# Patient Record
Sex: MALE | Race: WHITE | NOT HISPANIC OR LATINO | Employment: OTHER | URBAN - METROPOLITAN AREA
[De-identification: names, ages, dates, MRNs, and addresses within clinical notes are randomized per-mention and may not be internally consistent; named-entity substitution may affect disease eponyms.]

---

## 2017-03-23 ENCOUNTER — OFFICE VISIT (OUTPATIENT)
Dept: INTERNAL MEDICINE | Facility: CLINIC | Age: 37
End: 2017-03-23

## 2017-03-23 VITALS
DIASTOLIC BLOOD PRESSURE: 76 MMHG | TEMPERATURE: 97.8 F | BODY MASS INDEX: 28.25 KG/M2 | HEIGHT: 68 IN | WEIGHT: 186.4 LBS | SYSTOLIC BLOOD PRESSURE: 128 MMHG

## 2017-03-23 DIAGNOSIS — L30.9 ECZEMA, UNSPECIFIED TYPE: ICD-10-CM

## 2017-03-23 DIAGNOSIS — R06.83 SNORING: Primary | ICD-10-CM

## 2017-03-23 DIAGNOSIS — J45.20 MILD INTERMITTENT ASTHMA WITHOUT COMPLICATION: ICD-10-CM

## 2017-03-23 PROBLEM — K21.9 GASTROESOPHAGEAL REFLUX DISEASE WITHOUT ESOPHAGITIS: Status: ACTIVE | Noted: 2017-03-23

## 2017-03-23 PROBLEM — H93.19 TINNITUS: Status: ACTIVE | Noted: 2017-03-23

## 2017-03-23 PROCEDURE — 99204 OFFICE O/P NEW MOD 45 MIN: CPT | Performed by: FAMILY MEDICINE

## 2017-03-23 RX ORDER — FLUTICASONE PROPIONATE 110 UG/1
1 AEROSOL, METERED RESPIRATORY (INHALATION)
COMMUNITY
End: 2017-03-23

## 2017-03-23 RX ORDER — ALBUTEROL SULFATE 90 UG/1
AEROSOL, METERED RESPIRATORY (INHALATION)
Qty: 18 G | Refills: 2 | Status: SHIPPED | OUTPATIENT
Start: 2017-03-23 | End: 2019-01-21 | Stop reason: SDUPTHER

## 2017-03-23 RX ORDER — TRIAMCINOLONE ACETONIDE 1 MG/G
CREAM TOPICAL 2 TIMES DAILY
COMMUNITY
End: 2018-06-02

## 2017-03-23 NOTE — PROGRESS NOTES
"Subjective   Bennie Tapia is a 36 y.o. male.     History of Present Illness   Pt here to re establish. Not seen in several years due to insurance.    GI- GERD. Today pt reports he was on nexium for a long time and then he improved. Now he only has issues if he eats certain foods such as onion. Uses OTC omeprazole.     ALLERGIES- asthma and eczema, currently on prn rescue inhaler and triamcinolone cream. Today pt reports he is having to use the proair about 2x/wk. Occurs with temp changes. Is concerned re sleep apnea. Was advised he has a small airway by the dentist and his wife has told him that he snores with apnea. He uses the triamcinalone on his fingers prn. Has not needed to use much and still has plenty.    The following portions of the patient's history were reviewed and updated as appropriate: current medications, past family history, past medical history, past social history, past surgical history and problem list.    Review of Systems   Constitutional: Positive for fatigue.   HENT: Positive for hearing loss.    Eyes: Positive for itching.   Respiratory: Positive for cough, shortness of breath (when lying down) and wheezing.    Cardiovascular: Negative for chest pain.   Gastrointestinal: Negative for abdominal distention and abdominal pain.   Skin: Positive for rash (itching). Negative for color change.   Neurological: Negative for tremors, speech difficulty and headaches.   Psychiatric/Behavioral: Negative for agitation and confusion.   All other systems reviewed and are negative.        Current Outpatient Prescriptions:   •  albuterol (PROAIR HFA) 108 (90 BASE) MCG/ACT inhaler, 2 puffs q4-6hr prn asthma, wheeze, cough or shortness of breath, Disp: 18 g, Rfl: 2  •  triamcinolone (KENALOG) 0.1 % cream, Apply  topically 2 (Two) Times a Day., Disp: , Rfl:     Objective     /76  Temp 97.8 °F (36.6 °C)  Ht 68\" (172.7 cm)  Wt 186 lb 6.4 oz (84.6 kg)  BMI 28.34 kg/m2    Physical Exam "   Constitutional: He is oriented to person, place, and time. He appears well-developed and well-nourished.   HENT:   Right Ear: Tympanic membrane and ear canal normal.   Left Ear: Tympanic membrane and ear canal normal.   Mouth/Throat: Oropharynx is clear and moist.   Eyes: Conjunctivae and EOM are normal. Pupils are equal, round, and reactive to light.   Neck: No thyromegaly present.   Cardiovascular: Normal rate and regular rhythm.    Pulmonary/Chest: Effort normal and breath sounds normal.   Neurological: He is alert and oriented to person, place, and time.   Skin: Skin is warm and dry.   Psychiatric: He has a normal mood and affect.   Vitals reviewed.      Assessment/Plan   Bennie was seen today for establish care.    Diagnoses and all orders for this visit:    Snoring  -     Ambulatory Referral to Sleep Medicine    Mild intermittent asthma without complication    Eczema, unspecified type    Other orders  -     albuterol (PROAIR HFA) 108 (90 BASE) MCG/ACT inhaler; 2 puffs q4-6hr prn asthma, wheeze, cough or shortness of breath      1. RESP- asthma, mild and intermittent. Continue the proair. Discussed that if he starts to need this every day, to come in.   2. SLEEP- concern re apena based on hx. Will refer to sleep center.  3. DERM- hand dermatitis. edu provided. Pt will continue to use the steroid cream as needed.  4. GI- GERD. Pt will continue to use OTC. Will monitor calcium, B12, etc. Will do labs at her CPE.  5. RECHECK- 1yr CPE

## 2017-03-23 NOTE — PATIENT INSTRUCTIONS
1. RESP- asthma, mild and intermittent. Continue the proair. Discussed that if he starts to need this every day, to come in.   2. SLEEP- concern re apena based on hx. Will refer to sleep center.  3. DERM- hand dermatitis. edu provided. Pt will continue to use the steroid cream as needed.  4. GI- GERD. Pt will continue to use OTC. Will monitor calcium, B12, etc. Will do labs at her CPE.  5. RECHECK- 1yr CPE

## 2017-08-10 ENCOUNTER — CONSULT (OUTPATIENT)
Dept: SLEEP MEDICINE | Facility: HOSPITAL | Age: 37
End: 2017-08-10

## 2017-08-10 VITALS
WEIGHT: 191 LBS | OXYGEN SATURATION: 98 % | SYSTOLIC BLOOD PRESSURE: 112 MMHG | DIASTOLIC BLOOD PRESSURE: 78 MMHG | BODY MASS INDEX: 28.95 KG/M2 | HEIGHT: 68 IN | HEART RATE: 60 BPM

## 2017-08-10 DIAGNOSIS — R06.83 SNORING: Primary | ICD-10-CM

## 2017-08-10 DIAGNOSIS — G47.33 OBSTRUCTIVE SLEEP APNEA, ADULT: ICD-10-CM

## 2017-08-10 PROCEDURE — 99203 OFFICE O/P NEW LOW 30 MIN: CPT | Performed by: INTERNAL MEDICINE

## 2017-08-10 RX ORDER — OMEPRAZOLE 20 MG/1
20 CAPSULE, DELAYED RELEASE ORAL DAILY
COMMUNITY

## 2017-08-10 RX ORDER — LORATADINE 10 MG/1
CAPSULE, LIQUID FILLED ORAL
COMMUNITY

## 2017-08-10 NOTE — PATIENT INSTRUCTIONS
Sleep Apnea  Sleep apnea is a condition in which breathing pauses or becomes shallow during sleep. Episodes of sleep apnea usually last 10 seconds or longer, and they may occur as many as 20 times an hour. Sleep apnea disrupts your sleep and keeps your body from getting the rest that it needs. This condition can increase your risk of certain health problems, including:  · Heart attack.  · Stroke.  · Obesity.  · Diabetes.  · Heart failure.  · Irregular heartbeat.  There are three kinds of sleep apnea:  · Obstructive sleep apnea. This kind is caused by a blocked or collapsed airway.  · Central sleep apnea. This kind happens when the part of the brain that controls breathing does not send the correct signals to the muscles that control breathing.  · Mixed sleep apnea. This is a combination of obstructive and central sleep apnea.  CAUSES  The most common cause of this condition is a collapsed or blocked airway. An airway can collapse or become blocked if:  · Your throat muscles are abnormally relaxed.  · Your tongue and tonsils are larger than normal.  · You are overweight.  · Your airway is smaller than normal.  RISK FACTORS  This condition is more likely to develop in people who:  · Are overweight.  · Smoke.  · Have a smaller than normal airway.  · Are elderly.  · Are male.  · Drink alcohol.  · Take sedatives or tranquilizers.  · Have a family history of sleep apnea.  SYMPTOMS  Symptoms of this condition include:  · Trouble staying asleep.  · Daytime sleepiness and tiredness.  · Irritability.  · Loud snoring.  · Morning headaches.  · Trouble concentrating.  · Forgetfulness.  · Decreased interest in sex.  · Unexplained sleepiness.  · Mood swings.  · Personality changes.  · Feelings of depression.  · Waking up often during the night to urinate.  · Dry mouth.  · Sore throat.  DIAGNOSIS  This condition may be diagnosed with:  · A medical history.  · A physical exam.  · A series of tests that are done while you are  sleeping (sleep study). These tests are usually done in a sleep lab, but they may also be done at home.  TREATMENT  Treatment for this condition aims to restore normal breathing and to ease symptoms during sleep. It may involve managing health issues that can affect breathing, such as high blood pressure or obesity. Treatment may include:  · Sleeping on your side.  · Using a decongestant if you have nasal congestion.  · Avoiding the use of depressants, including alcohol, sedatives, and narcotics.  · Losing weight if you are overweight.  · Making changes to your diet.  · Quitting smoking.  · Using a device to open your airway while you sleep, such as:    An oral appliance. This is a custom-made mouthpiece that shifts your lower jaw forward.    A continuous positive airway pressure (CPAP) device. This device delivers oxygen to your airway through a mask.    A nasal expiratory positive airway pressure (EPAP) device. This device has valves that you put into each nostril.    A bi-level positive airway pressure (BPAP) device. This device delivers oxygen to your airway through a mask.  · Surgery if other treatments do not work. During surgery, excess tissue is removed to create a wider airway.  It is important to get treatment for sleep apnea. Without treatment, this condition can lead to:  · High blood pressure.  · Coronary artery disease.  · (Men) An inability to achieve or maintain an erection (impotence).  · Reduced thinking abilities.  HOME CARE INSTRUCTIONS  · Make any lifestyle changes that your health care provider recommends.  · Eat a healthy, well-balanced diet.  · Take over-the-counter and prescription medicines only as told by your health care provider.  · Avoid using depressants, including alcohol, sedatives, and narcotics.  · Take steps to lose weight if you are overweight.  · If you were given a device to open your airway while you sleep, use it only as told by your health care provider.  · Do not use any  tobacco products, such as cigarettes, chewing tobacco, and e-cigarettes. If you need help quitting, ask your health care provider.  · Keep all follow-up visits as told by your health care provider. This is important.  SEEK MEDICAL CARE IF:  · The device that you received to open your airway during sleep is uncomfortable or does not seem to be working.  · Your symptoms do not improve.  · Your symptoms get worse.  SEEK IMMEDIATE MEDICAL CARE IF:  · You develop chest pain.  · You develop shortness of breath.  · You develop discomfort in your back, arms, or stomach.  · You have trouble speaking.  · You have weakness on one side of your body.  · You have drooping in your face.  These symptoms may represent a serious problem that is an emergency. Do not wait to see if the symptoms will go away. Get medical help right away. Call your local emergency services (911 in the [place] Do not drive yourself to the hospital.     This information is not intended to replace advice given to you by your health care provider. Make sure you discuss any questions you have with your health care provider.     Document Released: 12/08/2003 Document Revised: 04/10/2017 Document Reviewed: 09/26/2016  [employer] Patient Education ©2017 [employer]

## 2017-08-11 NOTE — PROGRESS NOTES
Subjective   Bennie Tapia is a 36 y.o. male is being seen for consultation today at the request of Kirstin Diego MD  he is referred for the evaluation of snoring and nonrestorative sleep.    History of Present Illness  Patient has a history of snoring for roughly 6 years.  He occasionally wakens feeling short of breath.  He is been noted to have apneas for roughly 4 years.  Says he does not sleep well at night.  He is gained roughly 10 pounds in the past year.  He has awakened gasping.  He says usually not rested in the morning.  He has a morning headache 1-2 days per week.  He denies falling asleep during the day at work.  He sometimes falls asleep if he sitting quietly at home.  He denies any problems while driving.    He's totally snores loudly and snores in all positions.  He has awakened with a dry mouth and choking as well as sore throat.  He denies ever breaking his nose but occasionally has trouble breathing through his nose when he has allergy problems.  He has a history of reflux symptoms and is on medicines as needed.  He denies hypnagogic hallucinations sleep paralysis or cataplexy.  He has occasional kicking at night but says it does not keep him awake.  He denies any chronic pain at night.    He goes to bed about 9:30 PM.  He states it will take about an hour to go to sleep.  He awakens twice during the night.  He gets about 7 hours of sleep but says he feels stiff and not very rested.  He denies any history of hypertension diabetes or coronary artery disease.  Allergies   Allergen Reactions   • Nickel           Current Outpatient Prescriptions:   •  albuterol (PROAIR HFA) 108 (90 BASE) MCG/ACT inhaler, 2 puffs q4-6hr prn asthma, wheeze, cough or shortness of breath, Disp: 18 g, Rfl: 2  •  Loratadine (CLARITIN) 10 MG capsule, Take  by mouth., Disp: , Rfl:   •  omeprazole (priLOSEC) 20 MG capsule, Take 20 mg by mouth Daily., Disp: , Rfl:   •  triamcinolone (KENALOG) 0.1 % cream, Apply   "topically 2 (Two) Times a Day., Disp: , Rfl:     Smoking status: Former Smoker                                                              Packs/day: 0.75      Years: 20.00    quit smoking 2015.  Smokeless status: Never Used                           History   Alcohol Use   • Yes     Comment: occasional Has less than 66 per month       Caffeine: He has 16 ounces of coffee 8 ounces of tea in 8 ounces of cola each day    Past Medical History:   Diagnosis Date   • GERD (gastroesophageal reflux disease)        Past Surgical History:   Procedure Laterality Date   • WISDOM TOOTH EXTRACTION  2003       Family History   Problem Relation Age of Onset   • Hypertension Father    • Heart attack Paternal Uncle    • Cancer Paternal Grandmother        The following portions of the patient's history were reviewed and updated as appropriate: allergies, current medications, past family history, past medical history, past social history, past surgical history and problem list.    Review of Systems   Constitutional: Positive for fatigue and unexpected weight change.   HENT: Positive for postnasal drip and tinnitus.    Eyes: Negative.    Respiratory: Negative.    Cardiovascular: Negative.    Gastrointestinal: Negative.    Endocrine: Negative.    Genitourinary: Negative.    Musculoskeletal: Positive for arthralgias, back pain, gait problem and joint swelling.   Skin: Positive for rash.   Allergic/Immunologic: Negative.    Neurological: Positive for headaches.   Hematological: Negative.    Psychiatric/Behavioral: Negative.      Prospect scores 4/24  Objective     /78  Pulse 60  Ht 68\" (172.7 cm)  Wt 191 lb (86.6 kg)  SpO2 98%  BMI 29.04 kg/m2     Physical Exam   Constitutional: He is oriented to person, place, and time. He appears well-developed and well-nourished.   He is overweight.   HENT:   Head: Normocephalic and atraumatic.   He has nasal airway narrowing and Mallampati class II anatomy   Eyes: EOM are normal. Pupils are " equal, round, and reactive to light.   Neck: Normal range of motion. Neck supple.   Cardiovascular: Normal rate, regular rhythm and normal heart sounds.    Pulmonary/Chest: Effort normal and breath sounds normal.   Abdominal: Soft. Bowel sounds are normal.   Musculoskeletal: Normal range of motion. He exhibits no edema.   Neurological: He is alert and oriented to person, place, and time.   Skin: Skin is warm and dry.   Psychiatric: He has a normal mood and affect. His behavior is normal.         Assessment/Plan   Bennie was seen today for sleeping problem.    Diagnoses and all orders for this visit:    Snoring  -     Home Sleep Study; Future    Obstructive sleep apnea, adult  -     Home Sleep Study; Future     patient is a history of snoring and nonrestorative sleep.  I think he gets a fairly good story for obstructive sleep apnea.  We will plan to proceed to evaluation with home sleep testing.  I've discussed possible therapies including CPAP weight loss oral appliances and surgery.  Also discussed possible complications of long-term untreated obstructive sleep apnea.  He is to return in follow-up here roughly 2 weeks after his study.  He is encouraged to lose weight.  He is encouraged to avoid alcohol and sedatives close to bedtime.  He is encouraged practice lateral position sleep.         Rosas Dennis MD [facility]  Sleep Medicine  Pulmonary and Critical Care Medicine

## 2017-08-12 ENCOUNTER — APPOINTMENT (OUTPATIENT)
Dept: CT IMAGING | Facility: HOSPITAL | Age: 37
End: 2017-08-12

## 2017-08-12 ENCOUNTER — HOSPITAL ENCOUNTER (EMERGENCY)
Facility: HOSPITAL | Age: 37
Discharge: HOME | End: 2017-08-12
Attending: EMERGENCY MEDICINE | Admitting: EMERGENCY MEDICINE

## 2017-08-12 VITALS
BODY MASS INDEX: 28.04 KG/M2 | RESPIRATION RATE: 16 BRPM | HEIGHT: 68 IN | SYSTOLIC BLOOD PRESSURE: 125 MMHG | OXYGEN SATURATION: 100 % | WEIGHT: 185 LBS | DIASTOLIC BLOOD PRESSURE: 88 MMHG | TEMPERATURE: 98.1 F | HEART RATE: 58 BPM

## 2017-08-12 DIAGNOSIS — N23 RENAL COLIC ON LEFT SIDE: Primary | ICD-10-CM

## 2017-08-12 DIAGNOSIS — N20.0 KIDNEY STONE: ICD-10-CM

## 2017-08-12 DIAGNOSIS — R03.0 ELEVATED BLOOD PRESSURE (NOT HYPERTENSION): ICD-10-CM

## 2017-08-12 LAB
ALBUMIN SERPL-MCNC: 4.4 G/DL (ref 3.2–4.8)
ALBUMIN/GLOB SERPL: 1.8 G/DL (ref 1.5–2.5)
ALP SERPL-CCNC: 64 U/L (ref 25–100)
ALT SERPL W P-5'-P-CCNC: 30 U/L (ref 7–40)
ANION GAP SERPL CALCULATED.3IONS-SCNC: 4 MMOL/L (ref 3–11)
AST SERPL-CCNC: 18 U/L (ref 0–33)
BACTERIA UR QL AUTO: ABNORMAL /HPF
BASOPHILS # BLD AUTO: 0.04 10*3/MM3 (ref 0–0.2)
BASOPHILS NFR BLD AUTO: 0.5 % (ref 0–1)
BILIRUB SERPL-MCNC: 0.4 MG/DL (ref 0.3–1.2)
BILIRUB UR QL STRIP: ABNORMAL
BUN BLD-MCNC: 17 MG/DL (ref 9–23)
BUN/CREAT SERPL: 15.5 (ref 7–25)
CALCIUM SPEC-SCNC: 9.3 MG/DL (ref 8.7–10.4)
CHLORIDE SERPL-SCNC: 106 MMOL/L (ref 99–109)
CLARITY UR: CLEAR
CO2 SERPL-SCNC: 30 MMOL/L (ref 20–31)
COLOR UR: ABNORMAL
CREAT BLD-MCNC: 1.1 MG/DL (ref 0.6–1.3)
DEPRECATED RDW RBC AUTO: 37.8 FL (ref 37–54)
EOSINOPHIL # BLD AUTO: 0.54 10*3/MM3 (ref 0–0.3)
EOSINOPHIL NFR BLD AUTO: 7.3 % (ref 0–3)
ERYTHROCYTE [DISTWIDTH] IN BLOOD BY AUTOMATED COUNT: 11.9 % (ref 11.3–14.5)
GFR SERPL CREATININE-BSD FRML MDRD: 75 ML/MIN/1.73
GLOBULIN UR ELPH-MCNC: 2.4 GM/DL
GLUCOSE BLD-MCNC: 123 MG/DL (ref 70–100)
GLUCOSE UR STRIP-MCNC: NEGATIVE MG/DL
HCT VFR BLD AUTO: 44.6 % (ref 38.9–50.9)
HGB BLD-MCNC: 15.1 G/DL (ref 13.1–17.5)
HGB UR QL STRIP.AUTO: ABNORMAL
HOLD SPECIMEN: NORMAL
HOLD SPECIMEN: NORMAL
HYALINE CASTS UR QL AUTO: ABNORMAL /LPF
IMM GRANULOCYTES # BLD: 0.01 10*3/MM3 (ref 0–0.03)
IMM GRANULOCYTES NFR BLD: 0.1 % (ref 0–0.6)
KETONES UR QL STRIP: NEGATIVE
LEUKOCYTE ESTERASE UR QL STRIP.AUTO: NEGATIVE
LIPASE SERPL-CCNC: 36 U/L (ref 6–51)
LYMPHOCYTES # BLD AUTO: 2.93 10*3/MM3 (ref 0.6–4.8)
LYMPHOCYTES NFR BLD AUTO: 39.8 % (ref 24–44)
MCH RBC QN AUTO: 29.9 PG (ref 27–31)
MCHC RBC AUTO-ENTMCNC: 33.9 G/DL (ref 32–36)
MCV RBC AUTO: 88.3 FL (ref 80–99)
MONOCYTES # BLD AUTO: 0.56 10*3/MM3 (ref 0–1)
MONOCYTES NFR BLD AUTO: 7.6 % (ref 0–12)
MUCOUS THREADS URNS QL MICRO: ABNORMAL /HPF
NEUTROPHILS # BLD AUTO: 3.29 10*3/MM3 (ref 1.5–8.3)
NEUTROPHILS NFR BLD AUTO: 44.7 % (ref 41–71)
NITRITE UR QL STRIP: NEGATIVE
PH UR STRIP.AUTO: 5.5 [PH] (ref 5–8)
PLATELET # BLD AUTO: 251 10*3/MM3 (ref 150–450)
PMV BLD AUTO: 9.6 FL (ref 6–12)
POTASSIUM BLD-SCNC: 4 MMOL/L (ref 3.5–5.5)
PROT SERPL-MCNC: 6.8 G/DL (ref 5.7–8.2)
PROT UR QL STRIP: ABNORMAL
RBC # BLD AUTO: 5.05 10*6/MM3 (ref 4.2–5.76)
RBC # UR: ABNORMAL /HPF
REF LAB TEST METHOD: ABNORMAL
SODIUM BLD-SCNC: 140 MMOL/L (ref 132–146)
SP GR UR STRIP: >=1.03 (ref 1–1.03)
SQUAMOUS #/AREA URNS HPF: ABNORMAL /HPF
UROBILINOGEN UR QL STRIP: ABNORMAL
WBC NRBC COR # BLD: 7.37 10*3/MM3 (ref 3.5–10.8)
WBC UR QL AUTO: ABNORMAL /HPF
WHOLE BLOOD HOLD SPECIMEN: NORMAL
WHOLE BLOOD HOLD SPECIMEN: NORMAL

## 2017-08-12 PROCEDURE — 81001 URINALYSIS AUTO W/SCOPE: CPT | Performed by: EMERGENCY MEDICINE

## 2017-08-12 PROCEDURE — 96374 THER/PROPH/DIAG INJ IV PUSH: CPT

## 2017-08-12 PROCEDURE — 74176 CT ABD & PELVIS W/O CONTRAST: CPT

## 2017-08-12 PROCEDURE — 99284 EMERGENCY DEPT VISIT MOD MDM: CPT

## 2017-08-12 PROCEDURE — 83690 ASSAY OF LIPASE: CPT | Performed by: EMERGENCY MEDICINE

## 2017-08-12 PROCEDURE — 80053 COMPREHEN METABOLIC PANEL: CPT | Performed by: EMERGENCY MEDICINE

## 2017-08-12 PROCEDURE — 25010000002 KETOROLAC TROMETHAMINE PER 15 MG: Performed by: EMERGENCY MEDICINE

## 2017-08-12 PROCEDURE — 96361 HYDRATE IV INFUSION ADD-ON: CPT

## 2017-08-12 PROCEDURE — 96375 TX/PRO/DX INJ NEW DRUG ADDON: CPT

## 2017-08-12 PROCEDURE — 85025 COMPLETE CBC W/AUTO DIFF WBC: CPT | Performed by: EMERGENCY MEDICINE

## 2017-08-12 PROCEDURE — 25010000002 ONDANSETRON PER 1 MG: Performed by: EMERGENCY MEDICINE

## 2017-08-12 RX ORDER — ONDANSETRON 2 MG/ML
4 INJECTION INTRAMUSCULAR; INTRAVENOUS ONCE
Status: COMPLETED | OUTPATIENT
Start: 2017-08-12 | End: 2017-08-12

## 2017-08-12 RX ORDER — ONDANSETRON 8 MG/1
8 TABLET, ORALLY DISINTEGRATING ORAL EVERY 8 HOURS PRN
Qty: 15 TABLET | Refills: 0 | Status: SHIPPED | OUTPATIENT
Start: 2017-08-12 | End: 2017-11-08

## 2017-08-12 RX ORDER — TRAMADOL HYDROCHLORIDE 50 MG/1
100 TABLET ORAL EVERY 4 HOURS PRN
Qty: 24 TABLET | Refills: 0 | Status: SHIPPED | OUTPATIENT
Start: 2017-08-12 | End: 2017-11-08

## 2017-08-12 RX ORDER — SODIUM CHLORIDE 0.9 % (FLUSH) 0.9 %
10 SYRINGE (ML) INJECTION AS NEEDED
Status: DISCONTINUED | OUTPATIENT
Start: 2017-08-12 | End: 2017-08-12 | Stop reason: HOSPADM

## 2017-08-12 RX ORDER — PHENAZOPYRIDINE HYDROCHLORIDE 200 MG/1
200 TABLET, FILM COATED ORAL 3 TIMES DAILY PRN
Qty: 9 TABLET | Refills: 0 | Status: SHIPPED | OUTPATIENT
Start: 2017-08-12 | End: 2017-11-08

## 2017-08-12 RX ORDER — KETOROLAC TROMETHAMINE 15 MG/ML
15 INJECTION, SOLUTION INTRAMUSCULAR; INTRAVENOUS ONCE
Status: COMPLETED | OUTPATIENT
Start: 2017-08-12 | End: 2017-08-12

## 2017-08-12 RX ORDER — KETOROLAC TROMETHAMINE 10 MG/1
10 TABLET, FILM COATED ORAL EVERY 6 HOURS PRN
Qty: 20 TABLET | Refills: 0 | Status: SHIPPED | OUTPATIENT
Start: 2017-08-12 | End: 2017-11-08

## 2017-08-12 RX ORDER — PHENAZOPYRIDINE HYDROCHLORIDE 100 MG/1
200 TABLET, FILM COATED ORAL ONCE
Status: COMPLETED | OUTPATIENT
Start: 2017-08-12 | End: 2017-08-12

## 2017-08-12 RX ADMIN — PHENAZOPYRIDINE HYDROCHLORIDE 200 MG: 100 TABLET ORAL at 06:05

## 2017-08-12 RX ADMIN — ONDANSETRON 4 MG: 2 INJECTION INTRAMUSCULAR; INTRAVENOUS at 05:55

## 2017-08-12 RX ADMIN — LIDOCAINE HYDROCHLORIDE 125.8 MG: 20 INJECTION INTRAVENOUS at 05:58

## 2017-08-12 RX ADMIN — KETOROLAC TROMETHAMINE 15 MG: 15 INJECTION, SOLUTION INTRAMUSCULAR; INTRAVENOUS at 06:00

## 2017-08-12 RX ADMIN — SODIUM CHLORIDE 1000 ML: 9 INJECTION, SOLUTION INTRAVENOUS at 05:59

## 2017-08-12 NOTE — ED PROVIDER NOTES
"Subjective   HPI Comments: The patient reports that yesterday he \"didn't feel right.  He reports that this morning he had sudden onset left CVA pain and right flank and now radiating denies a history of similar.  He denies any history of kidney stones.  He reports some nausea with \"dry heaves\".  He also reports that is difficult to urinate since yesterday.    Patient is a 36 y.o. male presenting with flank pain.   History provided by:  Patient  Flank Pain   Pain location:  L flank  Pain quality: sharp and stabbing    Pain radiates to:  Groin  Pain severity:  Moderate  Onset quality:  Sudden  Duration: This morning.  Timing:  Constant  Progression:  Worsening  Chronicity:  New  Context: awakening from sleep and retching    Context: not sick contacts, not suspicious food intake and not trauma    Relieved by:  Nothing  Worsened by:  Nothing  Ineffective treatments:  None tried  Associated symptoms: constipation and nausea    Associated symptoms: no chest pain, no diarrhea, no dysuria, no hematemesis, no hematochezia, no hematuria, no melena, no shortness of breath and no vomiting        Review of Systems   Constitutional: Negative.    Respiratory: Negative.  Negative for shortness of breath.    Cardiovascular: Negative.  Negative for chest pain.   Gastrointestinal: Positive for constipation and nausea. Negative for diarrhea, hematemesis, hematochezia, melena and vomiting.   Genitourinary: Positive for flank pain. Negative for dysuria and hematuria.   Allergic/Immunologic: Negative for immunocompromised state.   Hematological: Does not bruise/bleed easily.   All other systems reviewed and are negative.      Past Medical History:   Diagnosis Date   • GERD (gastroesophageal reflux disease)        Allergies   Allergen Reactions   • Nickel        Past Surgical History:   Procedure Laterality Date   • WISDOM TOOTH EXTRACTION  2003       Family History   Problem Relation Age of Onset   • Hypertension Father    • Heart attack " Paternal Uncle    • Cancer Paternal Grandmother        Social History     Social History   • Marital status:      Spouse name: N/A   • Number of children: N/A   • Years of education: N/A     Social History Main Topics   • Smoking status: Former Smoker   • Smokeless tobacco: Never Used   • Alcohol use Yes      Comment: occasional   • Drug use: No   • Sexual activity: Not Asked     Other Topics Concern   • None     Social History Narrative           Objective   Physical Exam   Constitutional: He is oriented to person, place, and time. He appears well-developed and well-nourished.   The patient appears uncomfortable.   HENT:   Head: Normocephalic and atraumatic.   Neck: Normal range of motion.   Cardiovascular: Normal rate, regular rhythm, normal heart sounds and intact distal pulses.    Pulmonary/Chest: Effort normal and breath sounds normal.   Abdominal: Soft. There is no tenderness. There is no rebound and no guarding.   Musculoskeletal: Normal range of motion.   Neurological: He is alert and oriented to person, place, and time.   Skin: Skin is warm and dry.   Psychiatric: He has a normal mood and affect. His behavior is normal.   Nursing note and vitals reviewed.      Procedures         ED Course  ED Course   Comment By Time   The patient reports some improvement in his symptoms.  He is resting in no significant distress.  Consistent with ureterolithiasis that has now transitioned into the bladder.  No evidence of obstruction.  We will check the urine and afterwards plan to discharge the patient home with symptomatic management.  He is to return for any concerns.  He voices understanding and agrees. Go Archuleta MD 08/12 0635                  [facility]  Number of Diagnoses or Management Options  Diagnosis management comments: Recent Results (from the past 24 hour(s))  -Comprehensive Metabolic Panel  Collection Time: 08/12/17  5:45 AM       Result                                            Value                          Ref Range                       Glucose                                           123 (H)                       70 - 100 mg/dL                  BUN                                               17                            9 - 23 mg/dL                    Creatinine                                        1.10                          0.60 - 1.30 mg/dL               Sodium                                            140                           132 - 146 mmol/L                Potassium                                         4.0                           3.5 - 5.5 mmol/L                Chloride                                          106                           99 - 109 mmol/L                 CO2                                               30.0                          20.0 - 31.0 mmol/L              Calcium                                           9.3                           8.7 - 10.4 mg/dL                Total Protein                                     6.8                           5.7 - 8.2 g/dL                  Albumin                                           4.40                          3.20 - 4.80 g/dL                ALT (SGPT)                                        30                            7 - 40 U/L                      AST (SGOT)                                        18                            0 - 33 U/L                      Alkaline Phosphatase                              64                            25 - 100 U/L                    Total Bilirubin                                   0.4                           0.3 - 1.2 mg/dL                 eGFR Non  Amer                             75                            >60 mL/min/1.73                 Globulin                                          2.4                           gm/dL                           A/G Ratio                                         1.8                           1.5 - 2.5 g/dL                   BUN/Creatinine Ratio                              15.5                          7.0 - 25.0                      Anion Gap                                         4.0                           3.0 - 11.0 mmol/L          -Lipase  Collection Time: 08/12/17  5:45 AM       Result                                            Value                         Ref Range                       Lipase                                            36                            6 - 51 U/L                 -Light Blue Top  Collection Time: 08/12/17  5:45 AM       Result                                            Value                         Ref Range                       Extra Tube                                        hold for add-on                                          -Green Top (Gel)  Collection Time: 08/12/17  5:45 AM       Result                                            Value                         Ref Range                       Extra Tube                                        Hold for add-ons.                                        -Lavender Top  Collection Time: 08/12/17  5:45 AM       Result                                            Value                         Ref Range                       Extra Tube                                        hold for add-on                                          -Gold Top - SST  Collection Time: 08/12/17  5:45 AM       Result                                            Value                         Ref Range                       Extra Tube                                        Hold for add-ons.                                        -CBC Auto Differential  Collection Time: 08/12/17  5:45 AM       Result                                            Value                         Ref Range                       WBC                                               7.37                          3.50 - 10.80 10*3/mm3           RBC                                               5.05                           4.20 - 5.76 10*6/mm3            Hemoglobin                                        15.1                          13.1 - 17.5 g/dL                Hematocrit                                        44.6                          38.9 - 50.9 %                   MCV                                               88.3                          80.0 - 99.0 fL                  MCH                                               29.9                          27.0 - 31.0 pg                  MCHC                                              33.9                          32.0 - 36.0 g/dL                RDW                                               11.9                          11.3 - 14.5 %                   RDW-SD                                            37.8                          37.0 - 54.0 fl                  MPV                                               9.6                           6.0 - 12.0 fL                   Platelets                                         251                           150 - 450 10*3/mm3              Neutrophil %                                      44.7                          41.0 - 71.0 %                   Lymphocyte %                                      39.8                          24.0 - 44.0 %                   Monocyte %                                        7.6                           0.0 - 12.0 %                    Eosinophil %                                      7.3 (H)                       0.0 - 3.0 %                     Basophil %                                        0.5                           0.0 - 1.0 %                     Immature Grans %                                  0.1                           0.0 - 0.6 %                     Neutrophils, Absolute                             3.29                          1.50 - 8.30 10*3/mm3            Lymphocytes, Absolute                             2.93                          0.60 - 4.80 10*3/mm3             Monocytes, Absolute                               0.56                          0.00 - 1.00 10*3/mm3            Eosinophils, Absolute                             0.54 (H)                      0.00 - 0.30 10*3/mm3            Basophils, Absolute                               0.04                          0.00 - 0.20 10*3/mm3            Immature Grans, Absolute                          0.01                          0.00 - 0.03 10*3/mm3       -Urinalysis With / Culture If Indicated  Collection Time: 08/12/17  6:49 AM       Result                                            Value                         Ref Range                       Color, UA                                         Dark Yellow (A)               Yellow, Straw                   Appearance, UA                                    Clear                         Clear                           pH, UA                                            5.5                           5.0 - 8.0                       Specific [place], UA                              >=1.030                       1.005 - 1.030                   Glucose, UA                                       Negative                      Negative                        Ketones, UA                                       Negative                      Negative                        Bilirubin, UA                                     Small (1+) (A)                Negative                        Blood, UA                                         Large (3+) (A)                Negative                        Protein, UA                                       Trace (A)                     Negative                        Leuk Esterase, UA                                 Negative                      Negative                        Nitrite, UA                                       Negative                      Negative                        Urobilinogen, UA                                  0.2 E.U./dL                    0.2 - 1.0 E.U./dL          -Urinalysis, Microscopic Only  Collection Time: 08/12/17  6:49 AM       Result                                            Value                         Ref Range                       RBC, UA                                           13-20 (A)                     None Seen, 0-2 /HPF             WBC, UA                                           None Seen                     None Seen /HPF                  Bacteria, UA                                      None Seen                     None Seen, Trace /HPF           Squamous Epithelial Cells, UA                     0-2                           None Seen, 0-2 /HPF             Hyaline Casts, UA                                 None Seen                     0 - 6 /LPF                      Mucus, UA                                         Large/3+ (A)                  None Seen, Trace /HPF           Methodology                                       Automated Microscopy                                     Note: In addition to lab results from this visit, the labs listed above may include labs taken at another facility or during a different encounter within the last 24 hours. Please correlate lab times with ED admission and discharge times for further clarification of the services performed during this visit.    CT Abdomen Pelvis Without Contrast   Final Result   Abnormal      2 mm stone in the urinary bladder likely having recently passed from the left     ureter given the history.  No hydroureteronephrosis.        THIS DOCUMENT HAS BEEN ELECTRONICALLY SIGNED BY GIANFRANCO BOOGIE MD     ------------------------------------------------               08/12/17 08/12/17 08/12/17                  0537         0630      0647     ------------------------------------------------   BP:          133/96       123/81               BP Location:    Left arm                           Patient Position:    Sitting                       "      Pulse:         58                              Resp:          16                              Temp:   98 °F (36.7 °C)                        TempSrc:      Oral                             SpO2:         99%                    98%       Weight: 185 lb (83.9 kg)                       Height:  68\" (172.7 cm)                       ------------------------------------------------  Medications  sodium chloride 0.9 % flush 10 mL (not administered)  sodium chloride 0.9 % flush 10 mL (not administered)  sodium chloride 0.9 % bolus 1,000 mL (1,000 mL Intravenous New Bag 8/12/17 0559)  ketorolac (TORADOL) injection 15 mg (15 mg Intravenous Given 8/12/17 0600)  ondansetron (ZOFRAN) injection 4 mg (4 mg Intravenous Given 8/12/17 0555)  phenazopyridine (PYRIDIUM) tablet 200 mg (200 mg Oral Given 8/12/17 0605)  lidocaine (cardiac) (XYLOCAINE) injection 125.8 mg (125.8 mg Intravenous Given 8/12/17 0558)  ECG/EMG Results (last 24 hours)     ** No results found for the last 24 hours. **           Amount and/or Complexity of Data Reviewed  Clinical lab tests: reviewed  Tests in the radiology section of CPT®: reviewed  Independent visualization of images, tracings, or specimens: yes        Final diagnoses:   Renal colic on left side   Kidney stone   Elevated blood pressure (not hypertension)            Go Archuleta MD  08/12/17 1671    "

## 2017-08-24 ENCOUNTER — HOSPITAL ENCOUNTER (OUTPATIENT)
Dept: SLEEP MEDICINE | Facility: HOSPITAL | Age: 37
Discharge: HOME | End: 2017-08-24
Attending: INTERNAL MEDICINE | Admitting: INTERNAL MEDICINE

## 2017-08-24 VITALS
BODY MASS INDEX: 28.89 KG/M2 | HEART RATE: 82 BPM | HEIGHT: 68 IN | SYSTOLIC BLOOD PRESSURE: 149 MMHG | DIASTOLIC BLOOD PRESSURE: 84 MMHG | OXYGEN SATURATION: 96 % | WEIGHT: 190.6 LBS

## 2017-08-24 DIAGNOSIS — R06.83 SNORING: ICD-10-CM

## 2017-08-24 DIAGNOSIS — G47.33 OBSTRUCTIVE SLEEP APNEA, ADULT: ICD-10-CM

## 2017-08-24 PROCEDURE — 95806 SLEEP STUDY UNATT&RESP EFFT: CPT

## 2017-08-24 PROCEDURE — 95806 SLEEP STUDY UNATT&RESP EFFT: CPT | Performed by: INTERNAL MEDICINE

## 2017-08-28 DIAGNOSIS — G47.33 OBSTRUCTIVE SLEEP APNEA, ADULT: Primary | ICD-10-CM

## 2017-08-28 DIAGNOSIS — R06.83 SNORING: ICD-10-CM

## 2017-08-28 DIAGNOSIS — G47.19 EXCESSIVE DAYTIME SLEEPINESS: ICD-10-CM

## 2017-11-08 ENCOUNTER — OFFICE VISIT (OUTPATIENT)
Dept: SLEEP MEDICINE | Facility: HOSPITAL | Age: 37
End: 2017-11-08

## 2017-11-08 ENCOUNTER — DOCUMENTATION (OUTPATIENT)
Dept: SLEEP MEDICINE | Facility: HOSPITAL | Age: 37
End: 2017-11-08

## 2017-11-08 VITALS
WEIGHT: 189 LBS | SYSTOLIC BLOOD PRESSURE: 140 MMHG | BODY MASS INDEX: 28.64 KG/M2 | HEART RATE: 76 BPM | HEIGHT: 68 IN | OXYGEN SATURATION: 98 % | DIASTOLIC BLOOD PRESSURE: 94 MMHG

## 2017-11-08 DIAGNOSIS — G47.34 NOCTURNAL HYPOXEMIA: ICD-10-CM

## 2017-11-08 DIAGNOSIS — G47.33 OBSTRUCTIVE SLEEP APNEA, ADULT: Primary | ICD-10-CM

## 2017-11-08 PROCEDURE — 99214 OFFICE O/P EST MOD 30 MIN: CPT | Performed by: INTERNAL MEDICINE

## 2017-11-08 NOTE — PROGRESS NOTES
Subjective   Bennie Tapia is a 37 y.o. male is here today for follow-up.  His history of snoring and nonrestorative sleep.  His primary care physician is Dr. Kirstin Diego    History of Present Illness  Patient has a history of snoring and nonrestorative sleep was seen here August 10.  He is overweight and has gastroesophageal reflux disease.  He had home sleep testing on August 24.  He thought his study night was fairly usual.  Says he still having some snoring and is up once or twice during the night.  He did not wish to get started on therapy until he returned.  He's had these previously had a bite guard for bruxism but had problems keeping end during the night.  Past Medical History:   Diagnosis Date   • GERD (gastroesophageal reflux disease)        Past Surgical History:   Procedure Laterality Date   • WISDOM TOOTH EXTRACTION  2003           Current Outpatient Prescriptions:   •  albuterol (PROAIR HFA) 108 (90 BASE) MCG/ACT inhaler, 2 puffs q4-6hr prn asthma, wheeze, cough or shortness of breath, Disp: 18 g, Rfl: 2  •  Loratadine (CLARITIN) 10 MG capsule, Take  by mouth., Disp: , Rfl:   •  omeprazole (priLOSEC) 20 MG capsule, Take 20 mg by mouth Daily., Disp: , Rfl:   •  triamcinolone (KENALOG) 0.1 % cream, Apply  topically 2 (Two) Times a Day., Disp: , Rfl:     Allergies   Allergen Reactions   • Nickel Rash     At the location of contact-if prolonged       The following portions of the patient's history were reviewed and updated as appropriate: allergies, current medications and problem list.    Review of Systems   Constitutional: Negative.    HENT: Positive for congestion and postnasal drip.    Eyes: Negative.    Respiratory: Positive for cough and wheezing.    Cardiovascular: Negative.    Endocrine: Negative.    Genitourinary: Negative.    Musculoskeletal: Negative.    Skin: Positive for rash.   Allergic/Immunologic: Positive for environmental allergies.   Neurological: Negative.    Hematological:  "Negative.    Psychiatric/Behavioral: Negative.    Ronks scores 8/24    Objective     /94  Pulse 76  Ht 68\" (172.7 cm)  Wt 189 lb (85.7 kg)  SpO2 98%  BMI 28.74 kg/m2    Physical Exam   Constitutional: He is oriented to person, place, and time. He appears well-developed and well-nourished.   He is overweight.   HENT:   Head: Normocephalic.   His nasal airway narrowing and Mallampati class III anatomy.   Eyes: EOM are normal. Pupils are equal, round, and reactive to light.   Neck: Normal range of motion. Neck supple.   Cardiovascular: Normal rate, regular rhythm and normal heart sounds.    Pulmonary/Chest: Effort normal and breath sounds normal.   Abdominal: Soft. Bowel sounds are normal.   Musculoskeletal: Normal range of motion. He exhibits no edema.   Neurological: He is alert and oriented to person, place, and time.   Skin: Skin is warm and dry.   Psychiatric: He has a normal mood and affect. His behavior is normal.    sleep study on August 24 showed overall an AHI of 16.7.  This is consistent with moderate obstructive sleep apnea.  When he was supine he had an index of 21.5.  On his left side his index was only 2.2.  Had significant nocturnal hypoxemia dropping his saturation 82% spending 15.7 minutes in desaturated state.      Assessment/Plan   Bennie was seen today for follow-up.    Diagnoses and all orders for this visit:    Obstructive sleep apnea, adult  -     Detailed AutoPAP Order    Nocturnal hypoxemia  -     Overnight Sleep Oximetry Study; Future      Patient overall does have moderate obstructive sleep apnea.  We have discussed possible therapies including CPAP, weight loss, oral appliances, and surgery.  After discussion he chose to go with CPAP therapy.  We will plan for an auto CPAP machine with nasal pillows.  He is encouraged to lose weight.  He is encouraged to avoid alcohol and sedatives close to bedtime.  He is encouraged practice lateral position sleep.  We will also get an " overnight oximetry study to make certain his CPAP is correcting his nocturnal hypoxemia.  We will plan to see him back then in 2 months.           Rosas Dennis MD [facility]  Sleep Medicine  Pulmonary and Critical Care Medicine      11/08/17  9:55 AM

## 2017-11-08 NOTE — PROGRESS NOTES
Patient was in for office visit and agreed to be set up on pap therapy. Patient will be using [facility] because they do take . Complete fax

## 2017-11-08 NOTE — PATIENT INSTRUCTIONS
Sleep Apnea  Sleep apnea is a condition in which breathing pauses or becomes shallow during sleep. Episodes of sleep apnea usually last 10 seconds or longer, and they may occur as many as 20 times an hour. Sleep apnea disrupts your sleep and keeps your body from getting the rest that it needs. This condition can increase your risk of certain health problems, including:  · Heart attack.  · Stroke.  · Obesity.  · Diabetes.  · Heart failure.  · Irregular heartbeat.  There are three kinds of sleep apnea:  · Obstructive sleep apnea. This kind is caused by a blocked or collapsed airway.  · Central sleep apnea. This kind happens when the part of the brain that controls breathing does not send the correct signals to the muscles that control breathing.  · Mixed sleep apnea. This is a combination of obstructive and central sleep apnea.  CAUSES  The most common cause of this condition is a collapsed or blocked airway. An airway can collapse or become blocked if:  · Your throat muscles are abnormally relaxed.  · Your tongue and tonsils are larger than normal.  · You are overweight.  · Your airway is smaller than normal.  RISK FACTORS  This condition is more likely to develop in people who:  · Are overweight.  · Smoke.  · Have a smaller than normal airway.  · Are elderly.  · Are male.  · Drink alcohol.  · Take sedatives or tranquilizers.  · Have a family history of sleep apnea.  SYMPTOMS  Symptoms of this condition include:  · Trouble staying asleep.  · Daytime sleepiness and tiredness.  · Irritability.  · Loud snoring.  · Morning headaches.  · Trouble concentrating.  · Forgetfulness.  · Decreased interest in sex.  · Unexplained sleepiness.  · Mood swings.  · Personality changes.  · Feelings of depression.  · Waking up often during the night to urinate.  · Dry mouth.  · Sore throat.  DIAGNOSIS  This condition may be diagnosed with:  · A medical history.  · A physical exam.  · A series of tests that are done while you are  sleeping (sleep study). These tests are usually done in a sleep lab, but they may also be done at home.  TREATMENT  Treatment for this condition aims to restore normal breathing and to ease symptoms during sleep. It may involve managing health issues that can affect breathing, such as high blood pressure or obesity. Treatment may include:  · Sleeping on your side.  · Using a decongestant if you have nasal congestion.  · Avoiding the use of depressants, including alcohol, sedatives, and narcotics.  · Losing weight if you are overweight.  · Making changes to your diet.  · Quitting smoking.  · Using a device to open your airway while you sleep, such as:    An oral appliance. This is a custom-made mouthpiece that shifts your lower jaw forward.    A continuous positive airway pressure (CPAP) device. This device delivers oxygen to your airway through a mask.    A nasal expiratory positive airway pressure (EPAP) device. This device has valves that you put into each nostril.    A bi-level positive airway pressure (BPAP) device. This device delivers oxygen to your airway through a mask.  · Surgery if other treatments do not work. During surgery, excess tissue is removed to create a wider airway.  It is important to get treatment for sleep apnea. Without treatment, this condition can lead to:  · High blood pressure.  · Coronary artery disease.  · (Men) An inability to achieve or maintain an erection (impotence).  · Reduced thinking abilities.  HOME CARE INSTRUCTIONS  · Make any lifestyle changes that your health care provider recommends.  · Eat a healthy, well-balanced diet.  · Take over-the-counter and prescription medicines only as told by your health care provider.  · Avoid using depressants, including alcohol, sedatives, and narcotics.  · Take steps to lose weight if you are overweight.  · If you were given a device to open your airway while you sleep, use it only as told by your health care provider.  · Do not use any  tobacco products, such as cigarettes, chewing tobacco, and e-cigarettes. If you need help quitting, ask your health care provider.  · Keep all follow-up visits as told by your health care provider. This is important.  SEEK MEDICAL CARE IF:  · The device that you received to open your airway during sleep is uncomfortable or does not seem to be working.  · Your symptoms do not improve.  · Your symptoms get worse.  SEEK IMMEDIATE MEDICAL CARE IF:  · You develop chest pain.  · You develop shortness of breath.  · You develop discomfort in your back, arms, or stomach.  · You have trouble speaking.  · You have weakness on one side of your body.  · You have drooping in your face.  These symptoms may represent a serious problem that is an emergency. Do not wait to see if the symptoms will go away. Get medical help right away. Call your local emergency services (911 in the [place] Do not drive yourself to the hospital.     This information is not intended to replace advice given to you by your health care provider. Make sure you discuss any questions you have with your health care provider.     Document Released: 12/08/2003 Document Revised: 04/10/2017 Document Reviewed: 09/26/2016  [employer] Interactive Patient Education ©2017 [employer]

## 2017-11-21 NOTE — PROGRESS NOTES
Adequate overnight o2  Rosas Dennis MD [facility]  Sleep Medicine  Pulmonary and Critical Care Medicine

## 2018-01-31 ENCOUNTER — OFFICE VISIT (OUTPATIENT)
Dept: SLEEP MEDICINE | Facility: HOSPITAL | Age: 38
End: 2018-01-31

## 2018-01-31 VITALS
OXYGEN SATURATION: 96 % | SYSTOLIC BLOOD PRESSURE: 138 MMHG | BODY MASS INDEX: 29.4 KG/M2 | HEIGHT: 68 IN | DIASTOLIC BLOOD PRESSURE: 78 MMHG | WEIGHT: 194 LBS | HEART RATE: 87 BPM

## 2018-01-31 DIAGNOSIS — G47.33 OBSTRUCTIVE SLEEP APNEA, ADULT: Primary | ICD-10-CM

## 2018-01-31 PROCEDURE — 99213 OFFICE O/P EST LOW 20 MIN: CPT | Performed by: INTERNAL MEDICINE

## 2018-05-24 ENCOUNTER — TELEPHONE (OUTPATIENT)
Dept: INTERNAL MEDICINE | Facility: CLINIC | Age: 38
End: 2018-05-24

## 2018-05-24 NOTE — TELEPHONE ENCOUNTER
PT HURT HIS KNEE THIS MORNING WHILE WORKING OUT. HE WENT TO THE ER AND HAD A XRAY, NOTHING IS BROKEN, SO THE DOCTOR WANTS HIM TO FOLLOW UP WITH DR SANTIAGO TO POSSIBLY GET AN MRI TO CHECK FOR OTHER DAMAGE. THE FIRST AVAILABLE ISN'T UNTIL June 8TH. IS THERE ANY TIME EARLIER THAT HE CAN COME IN?

## 2018-05-24 NOTE — TELEPHONE ENCOUNTER
I spoke with pt and advised him that we could work him in tomorrow for this.Pt accepted appt and [facility] contacted for records.

## 2018-05-25 ENCOUNTER — OFFICE VISIT (OUTPATIENT)
Dept: INTERNAL MEDICINE | Facility: CLINIC | Age: 38
End: 2018-05-25

## 2018-05-25 VITALS
BODY MASS INDEX: 29.4 KG/M2 | DIASTOLIC BLOOD PRESSURE: 72 MMHG | WEIGHT: 194 LBS | TEMPERATURE: 98.1 F | HEIGHT: 68 IN | SYSTOLIC BLOOD PRESSURE: 136 MMHG

## 2018-05-25 DIAGNOSIS — S89.92XD LEFT KNEE INJURY, SUBSEQUENT ENCOUNTER: Primary | ICD-10-CM

## 2018-05-25 PROCEDURE — 99213 OFFICE O/P EST LOW 20 MIN: CPT | Performed by: FAMILY MEDICINE

## 2018-05-25 RX ORDER — MELOXICAM 15 MG/1
TABLET ORAL
Qty: 30 TABLET | Refills: 1 | Status: SHIPPED | OUTPATIENT
Start: 2018-05-25 | End: 2020-07-01

## 2018-05-25 NOTE — PROGRESS NOTES
"Subjective   Bennie Tapia is a 37 y.o. male.     History of Present Illness   Here today as a work in for left knee injury. Last seen 3/23/17 to re establish.    ORTHO- ER note reviewed. Pt popped his knee while playing frisbee. Xray was normal. Pt was given splint. Today he reports he is wearing the splint every day. Is not having to wear it over night now.     The following portions of the patient's history were reviewed and updated as appropriate: current medications, past family history, past medical history, past social history, past surgical history and problem list.    Review of Systems   Cardiovascular: Negative for chest pain.   Gastrointestinal: Negative for abdominal distention and abdominal pain.   Musculoskeletal:        Left knee injury   Skin: Negative for color change.   Neurological: Negative for tremors, speech difficulty and headaches.   Psychiatric/Behavioral: Negative for agitation and confusion.   All other systems reviewed and are negative.        Current Outpatient Prescriptions:   •  albuterol (PROAIR HFA) 108 (90 BASE) MCG/ACT inhaler, 2 puffs q4-6hr prn asthma, wheeze, cough or shortness of breath, Disp: 18 g, Rfl: 2  •  Loratadine (CLARITIN) 10 MG capsule, Take  by mouth., Disp: , Rfl:   •  meloxicam (MOBIC) 15 MG tablet, 1 tab po qd prn pain or inflammation, Disp: 30 tablet, Rfl: 1  •  omeprazole (priLOSEC) 20 MG capsule, Take 20 mg by mouth Daily., Disp: , Rfl:   •  triamcinolone (KENALOG) 0.1 % cream, Apply  topically 2 (Two) Times a Day., Disp: , Rfl:     Objective     /72   Temp 98.1 °F (36.7 °C)   Ht 172.7 cm (68\")   Wt 88 kg (194 lb)   BMI 29.50 kg/m²     Physical Exam   Constitutional: He is oriented to person, place, and time. He appears well-developed and well-nourished.   HENT:   Right Ear: Tympanic membrane and ear canal normal.   Left Ear: Tympanic membrane and ear canal normal.   Mouth/Throat: Oropharynx is clear and moist.   Eyes: Conjunctivae and EOM are " normal. Pupils are equal, round, and reactive to light.   Neck: No thyromegaly present.   Cardiovascular: Normal rate and regular rhythm.    Pulmonary/Chest: Effort normal and breath sounds normal.   Musculoskeletal:   Stiff but no pain with knee flexion or extension. Cannot rotate   Neurological: He is alert and oriented to person, place, and time.   Skin: Skin is warm and dry.   Psychiatric: He has a normal mood and affect.   Vitals reviewed.      Assessment/Plan   Bennie was seen today for follow-up.    Diagnoses and all orders for this visit:    Left knee injury, subsequent encounter  -     meloxicam (MOBIC) 15 MG tablet; 1 tab po qd prn pain or inflammation  -     MRI Knee Left Without Contrast        1. ORTHO- left knee injury- discussed probable medial meniscus tear. Will order MRI, mobic and PT.  2. RECHECK- prn

## 2018-05-25 NOTE — PATIENT INSTRUCTIONS
1. ORTHO- left knee injury- discussed probable medial meniscus tear. Will order MRI, mobic and PT.  2. RECHECK- prn

## 2018-06-01 ENCOUNTER — HOSPITAL ENCOUNTER (OUTPATIENT)
Dept: MRI IMAGING | Facility: HOSPITAL | Age: 38
Discharge: HOME | End: 2018-06-01
Attending: FAMILY MEDICINE | Admitting: FAMILY MEDICINE

## 2018-06-01 PROCEDURE — 73721 MRI JNT OF LWR EXTRE W/O DYE: CPT

## 2018-06-06 DIAGNOSIS — S83.511A NEW TEAR OF ANTERIOR CRUCIATE LIGAMENT, RIGHT, INITIAL ENCOUNTER: Primary | ICD-10-CM

## 2018-07-16 PROCEDURE — 36415 COLL VENOUS BLD VENIPUNCTURE: CPT

## 2018-07-16 PROCEDURE — 96375 TX/PRO/DX INJ NEW DRUG ADDON: CPT

## 2018-07-16 PROCEDURE — 96361 HYDRATE IV INFUSION ADD-ON: CPT

## 2018-07-16 PROCEDURE — 96374 THER/PROPH/DIAG INJ IV PUSH: CPT

## 2018-07-16 PROCEDURE — 99284 EMERGENCY DEPT VISIT MOD MDM: CPT

## 2018-07-16 RX ORDER — SODIUM CHLORIDE 0.9 % (FLUSH) 0.9 %
10 SYRINGE (ML) INJECTION AS NEEDED
Status: DISCONTINUED | OUTPATIENT
Start: 2018-07-16 | End: 2018-07-17 | Stop reason: HOSPADM

## 2018-07-17 ENCOUNTER — HOSPITAL ENCOUNTER (EMERGENCY)
Facility: HOSPITAL | Age: 38
Discharge: HOME | End: 2018-07-17
Attending: EMERGENCY MEDICINE | Admitting: EMERGENCY MEDICINE

## 2018-07-17 VITALS
HEART RATE: 45 BPM | DIASTOLIC BLOOD PRESSURE: 75 MMHG | WEIGHT: 185 LBS | TEMPERATURE: 98.6 F | HEIGHT: 68 IN | BODY MASS INDEX: 28.04 KG/M2 | SYSTOLIC BLOOD PRESSURE: 113 MMHG | OXYGEN SATURATION: 99 % | RESPIRATION RATE: 18 BRPM

## 2018-07-17 DIAGNOSIS — R03.0 ELEVATED BLOOD PRESSURE READING WITHOUT DIAGNOSIS OF HYPERTENSION: ICD-10-CM

## 2018-07-17 DIAGNOSIS — N20.1 RIGHT URETERAL STONE: Primary | ICD-10-CM

## 2018-07-17 DIAGNOSIS — R10.9 RIGHT FLANK PAIN: ICD-10-CM

## 2018-07-17 LAB
ALBUMIN SERPL-MCNC: 4.52 G/DL (ref 3.2–4.8)
ALBUMIN/GLOB SERPL: 1.7 G/DL (ref 1.5–2.5)
ALP SERPL-CCNC: 65 U/L (ref 25–100)
ALT SERPL W P-5'-P-CCNC: 36 U/L (ref 7–40)
ANION GAP SERPL CALCULATED.3IONS-SCNC: 7 MMOL/L (ref 3–11)
AST SERPL-CCNC: 21 U/L (ref 0–33)
BACTERIA UR QL AUTO: ABNORMAL /HPF
BASOPHILS # BLD AUTO: 0.05 10*3/MM3 (ref 0–0.2)
BASOPHILS NFR BLD AUTO: 0.4 % (ref 0–1)
BILIRUB SERPL-MCNC: 0.4 MG/DL (ref 0.3–1.2)
BILIRUB UR QL STRIP: ABNORMAL
BUN BLD-MCNC: 13 MG/DL (ref 9–23)
BUN/CREAT SERPL: 11.7 (ref 7–25)
CALCIUM SPEC-SCNC: 9.4 MG/DL (ref 8.7–10.4)
CHLORIDE SERPL-SCNC: 108 MMOL/L (ref 99–109)
CLARITY UR: ABNORMAL
CO2 SERPL-SCNC: 26 MMOL/L (ref 20–31)
COLOR UR: ABNORMAL
CREAT BLD-MCNC: 1.11 MG/DL (ref 0.6–1.3)
DEPRECATED RDW RBC AUTO: 38.8 FL (ref 37–54)
EOSINOPHIL # BLD AUTO: 0.41 10*3/MM3 (ref 0–0.3)
EOSINOPHIL NFR BLD AUTO: 2.9 % (ref 0–3)
ERYTHROCYTE [DISTWIDTH] IN BLOOD BY AUTOMATED COUNT: 12 % (ref 11.3–14.5)
GFR SERPL CREATININE-BSD FRML MDRD: 74 ML/MIN/1.73
GLOBULIN UR ELPH-MCNC: 2.7 GM/DL
GLUCOSE BLD-MCNC: 146 MG/DL (ref 70–100)
GLUCOSE UR STRIP-MCNC: NEGATIVE MG/DL
HCT VFR BLD AUTO: 45.6 % (ref 38.9–50.9)
HGB BLD-MCNC: 15.5 G/DL (ref 13.1–17.5)
HGB UR QL STRIP.AUTO: ABNORMAL
HOLD SPECIMEN: NORMAL
HOLD SPECIMEN: NORMAL
HYALINE CASTS UR QL AUTO: ABNORMAL /LPF
IMM GRANULOCYTES # BLD: 0.05 10*3/MM3 (ref 0–0.03)
IMM GRANULOCYTES NFR BLD: 0.4 % (ref 0–0.6)
KETONES UR QL STRIP: NEGATIVE
LEUKOCYTE ESTERASE UR QL STRIP.AUTO: ABNORMAL
LIPASE SERPL-CCNC: 32 U/L (ref 6–51)
LYMPHOCYTES # BLD AUTO: 2.34 10*3/MM3 (ref 0.6–4.8)
LYMPHOCYTES NFR BLD AUTO: 16.5 % (ref 24–44)
MCH RBC QN AUTO: 30.6 PG (ref 27–31)
MCHC RBC AUTO-ENTMCNC: 34 G/DL (ref 32–36)
MCV RBC AUTO: 89.9 FL (ref 80–99)
MONOCYTES # BLD AUTO: 0.9 10*3/MM3 (ref 0–1)
MONOCYTES NFR BLD AUTO: 6.4 % (ref 0–12)
NEUTROPHILS # BLD AUTO: 10.46 10*3/MM3 (ref 1.5–8.3)
NEUTROPHILS NFR BLD AUTO: 73.8 % (ref 41–71)
NITRITE UR QL STRIP: NEGATIVE
PH UR STRIP.AUTO: 5.5 [PH] (ref 5–8)
PLATELET # BLD AUTO: 278 10*3/MM3 (ref 150–450)
PMV BLD AUTO: 10.1 FL (ref 6–12)
POTASSIUM BLD-SCNC: 4 MMOL/L (ref 3.5–5.5)
PROT SERPL-MCNC: 7.2 G/DL (ref 5.7–8.2)
PROT UR QL STRIP: ABNORMAL
RBC # BLD AUTO: 5.07 10*6/MM3 (ref 4.2–5.76)
RBC # UR: ABNORMAL /HPF
REF LAB TEST METHOD: ABNORMAL
SODIUM BLD-SCNC: 141 MMOL/L (ref 132–146)
SP GR UR STRIP: 1.03 (ref 1–1.03)
SQUAMOUS #/AREA URNS HPF: ABNORMAL /HPF
UROBILINOGEN UR QL STRIP: ABNORMAL
WBC NRBC COR # BLD: 14.16 10*3/MM3 (ref 3.5–10.8)
WBC UR QL AUTO: ABNORMAL /HPF
WHOLE BLOOD HOLD SPECIMEN: NORMAL
WHOLE BLOOD HOLD SPECIMEN: NORMAL

## 2018-07-17 PROCEDURE — 25010000002 ONDANSETRON PER 1 MG

## 2018-07-17 PROCEDURE — 25010000002 HYDROMORPHONE PER 4 MG: Performed by: EMERGENCY MEDICINE

## 2018-07-17 PROCEDURE — 83690 ASSAY OF LIPASE: CPT | Performed by: EMERGENCY MEDICINE

## 2018-07-17 PROCEDURE — 96375 TX/PRO/DX INJ NEW DRUG ADDON: CPT

## 2018-07-17 PROCEDURE — 80053 COMPREHEN METABOLIC PANEL: CPT | Performed by: EMERGENCY MEDICINE

## 2018-07-17 PROCEDURE — 25010000002 KETOROLAC TROMETHAMINE PER 15 MG: Performed by: EMERGENCY MEDICINE

## 2018-07-17 PROCEDURE — 96361 HYDRATE IV INFUSION ADD-ON: CPT

## 2018-07-17 PROCEDURE — 85025 COMPLETE CBC W/AUTO DIFF WBC: CPT | Performed by: EMERGENCY MEDICINE

## 2018-07-17 PROCEDURE — 96374 THER/PROPH/DIAG INJ IV PUSH: CPT

## 2018-07-17 PROCEDURE — 81001 URINALYSIS AUTO W/SCOPE: CPT | Performed by: EMERGENCY MEDICINE

## 2018-07-17 RX ORDER — ONDANSETRON 4 MG/1
4 TABLET, ORALLY DISINTEGRATING ORAL EVERY 8 HOURS PRN
Qty: 6 TABLET | Refills: 0 | Status: SHIPPED | OUTPATIENT
Start: 2018-07-17 | End: 2020-07-01

## 2018-07-17 RX ORDER — ONDANSETRON 2 MG/ML
INJECTION INTRAMUSCULAR; INTRAVENOUS
Status: COMPLETED
Start: 2018-07-17 | End: 2018-07-17

## 2018-07-17 RX ORDER — TAMSULOSIN HYDROCHLORIDE 0.4 MG/1
1 CAPSULE ORAL NIGHTLY
Qty: 7 CAPSULE | Refills: 0 | Status: SHIPPED | OUTPATIENT
Start: 2018-07-17

## 2018-07-17 RX ORDER — KETOROLAC TROMETHAMINE 15 MG/ML
15 INJECTION, SOLUTION INTRAMUSCULAR; INTRAVENOUS ONCE
Status: COMPLETED | OUTPATIENT
Start: 2018-07-17 | End: 2018-07-17

## 2018-07-17 RX ORDER — HYDROMORPHONE HYDROCHLORIDE 2 MG/1
2 TABLET ORAL EVERY 4 HOURS PRN
Qty: 12 TABLET | Refills: 0 | Status: SHIPPED | OUTPATIENT
Start: 2018-07-17 | End: 2020-07-01

## 2018-07-17 RX ORDER — SODIUM CHLORIDE 0.9 % (FLUSH) 0.9 %
10 SYRINGE (ML) INJECTION AS NEEDED
Status: DISCONTINUED | OUTPATIENT
Start: 2018-07-17 | End: 2018-07-17 | Stop reason: HOSPADM

## 2018-07-17 RX ORDER — ONDANSETRON 2 MG/ML
4 INJECTION INTRAMUSCULAR; INTRAVENOUS ONCE
Status: COMPLETED | OUTPATIENT
Start: 2018-07-17 | End: 2018-07-17

## 2018-07-17 RX ADMIN — KETOROLAC TROMETHAMINE 15 MG: 15 INJECTION, SOLUTION INTRAMUSCULAR; INTRAVENOUS at 00:41

## 2018-07-17 RX ADMIN — ONDANSETRON 4 MG: 2 INJECTION INTRAMUSCULAR; INTRAVENOUS at 00:41

## 2018-07-17 RX ADMIN — SODIUM CHLORIDE 1000 ML: 9 INJECTION, SOLUTION INTRAVENOUS at 00:42

## 2018-07-17 RX ADMIN — HYDROMORPHONE HYDROCHLORIDE 0.5 MG: 1 INJECTION, SOLUTION INTRAMUSCULAR; INTRAVENOUS; SUBCUTANEOUS at 00:41

## 2018-07-18 ENCOUNTER — APPOINTMENT (OUTPATIENT)
Dept: CT IMAGING | Facility: HOSPITAL | Age: 38
End: 2018-07-18

## 2018-07-18 ENCOUNTER — HOSPITAL ENCOUNTER (EMERGENCY)
Facility: HOSPITAL | Age: 38
Discharge: HOME | End: 2018-07-18
Attending: EMERGENCY MEDICINE | Admitting: EMERGENCY MEDICINE

## 2018-07-18 VITALS
DIASTOLIC BLOOD PRESSURE: 82 MMHG | OXYGEN SATURATION: 97 % | SYSTOLIC BLOOD PRESSURE: 125 MMHG | TEMPERATURE: 97.4 F | BODY MASS INDEX: 28.04 KG/M2 | HEART RATE: 51 BPM | RESPIRATION RATE: 23 BRPM | WEIGHT: 185 LBS | HEIGHT: 68 IN

## 2018-07-18 DIAGNOSIS — N20.1 URETERAL STONE: Primary | ICD-10-CM

## 2018-07-18 DIAGNOSIS — D72.829 LEUKOCYTOSIS, UNSPECIFIED TYPE: ICD-10-CM

## 2018-07-18 DIAGNOSIS — R31.9 HEMATURIA, UNSPECIFIED TYPE: ICD-10-CM

## 2018-07-18 DIAGNOSIS — N23 RENAL COLIC: ICD-10-CM

## 2018-07-18 LAB
ALBUMIN SERPL-MCNC: 4.42 G/DL (ref 3.2–4.8)
ALBUMIN/GLOB SERPL: 1.7 G/DL (ref 1.5–2.5)
ALP SERPL-CCNC: 57 U/L (ref 25–100)
ALT SERPL W P-5'-P-CCNC: 36 U/L (ref 7–40)
ANION GAP SERPL CALCULATED.3IONS-SCNC: 8 MMOL/L (ref 3–11)
AST SERPL-CCNC: 22 U/L (ref 0–33)
BACTERIA UR QL AUTO: ABNORMAL /HPF
BASOPHILS # BLD AUTO: 0.04 10*3/MM3 (ref 0–0.2)
BASOPHILS NFR BLD AUTO: 0.3 % (ref 0–1)
BILIRUB SERPL-MCNC: 0.4 MG/DL (ref 0.3–1.2)
BILIRUB UR QL STRIP: NEGATIVE
BUN BLD-MCNC: 19 MG/DL (ref 9–23)
BUN/CREAT SERPL: 15.4 (ref 7–25)
CALCIUM SPEC-SCNC: 9.3 MG/DL (ref 8.7–10.4)
CHLORIDE SERPL-SCNC: 107 MMOL/L (ref 99–109)
CLARITY UR: CLEAR
CO2 SERPL-SCNC: 26 MMOL/L (ref 20–31)
COLOR UR: YELLOW
CREAT BLD-MCNC: 1.23 MG/DL (ref 0.6–1.3)
DEPRECATED RDW RBC AUTO: 38.8 FL (ref 37–54)
EOSINOPHIL # BLD AUTO: 0.19 10*3/MM3 (ref 0–0.3)
EOSINOPHIL NFR BLD AUTO: 1.2 % (ref 0–3)
ERYTHROCYTE [DISTWIDTH] IN BLOOD BY AUTOMATED COUNT: 12 % (ref 11.3–14.5)
GFR SERPL CREATININE-BSD FRML MDRD: 66 ML/MIN/1.73
GLOBULIN UR ELPH-MCNC: 2.6 GM/DL
GLUCOSE BLD-MCNC: 121 MG/DL (ref 70–100)
GLUCOSE UR STRIP-MCNC: NEGATIVE MG/DL
HCT VFR BLD AUTO: 43.3 % (ref 38.9–50.9)
HGB BLD-MCNC: 14.7 G/DL (ref 13.1–17.5)
HGB UR QL STRIP.AUTO: ABNORMAL
HOLD SPECIMEN: NORMAL
HOLD SPECIMEN: NORMAL
HYALINE CASTS UR QL AUTO: ABNORMAL /LPF
IMM GRANULOCYTES # BLD: 0.06 10*3/MM3 (ref 0–0.03)
IMM GRANULOCYTES NFR BLD: 0.4 % (ref 0–0.6)
KETONES UR QL STRIP: ABNORMAL
LEUKOCYTE ESTERASE UR QL STRIP.AUTO: ABNORMAL
LYMPHOCYTES # BLD AUTO: 1.32 10*3/MM3 (ref 0.6–4.8)
LYMPHOCYTES NFR BLD AUTO: 8.4 % (ref 24–44)
MCH RBC QN AUTO: 30.4 PG (ref 27–31)
MCHC RBC AUTO-ENTMCNC: 33.9 G/DL (ref 32–36)
MCV RBC AUTO: 89.6 FL (ref 80–99)
MONOCYTES # BLD AUTO: 0.74 10*3/MM3 (ref 0–1)
MONOCYTES NFR BLD AUTO: 4.7 % (ref 0–12)
NEUTROPHILS # BLD AUTO: 13.34 10*3/MM3 (ref 1.5–8.3)
NEUTROPHILS NFR BLD AUTO: 85.4 % (ref 41–71)
NITRITE UR QL STRIP: NEGATIVE
PH UR STRIP.AUTO: <=5 [PH] (ref 5–8)
PLATELET # BLD AUTO: 268 10*3/MM3 (ref 150–450)
PMV BLD AUTO: 10 FL (ref 6–12)
POTASSIUM BLD-SCNC: 3.7 MMOL/L (ref 3.5–5.5)
PROT SERPL-MCNC: 7 G/DL (ref 5.7–8.2)
PROT UR QL STRIP: ABNORMAL
RBC # BLD AUTO: 4.83 10*6/MM3 (ref 4.2–5.76)
RBC # UR: ABNORMAL /HPF
REF LAB TEST METHOD: ABNORMAL
SODIUM BLD-SCNC: 141 MMOL/L (ref 132–146)
SP GR UR STRIP: 1.03 (ref 1–1.03)
SQUAMOUS #/AREA URNS HPF: ABNORMAL /HPF
UROBILINOGEN UR QL STRIP: ABNORMAL
WBC NRBC COR # BLD: 15.63 10*3/MM3 (ref 3.5–10.8)
WBC UR QL AUTO: ABNORMAL /HPF
WHOLE BLOOD HOLD SPECIMEN: NORMAL
WHOLE BLOOD HOLD SPECIMEN: NORMAL

## 2018-07-18 PROCEDURE — 25010000002 HYDROMORPHONE PER 4 MG: Performed by: EMERGENCY MEDICINE

## 2018-07-18 PROCEDURE — 87086 URINE CULTURE/COLONY COUNT: CPT | Performed by: NURSE PRACTITIONER

## 2018-07-18 PROCEDURE — 96361 HYDRATE IV INFUSION ADD-ON: CPT

## 2018-07-18 PROCEDURE — 96376 TX/PRO/DX INJ SAME DRUG ADON: CPT

## 2018-07-18 PROCEDURE — 74176 CT ABD & PELVIS W/O CONTRAST: CPT

## 2018-07-18 PROCEDURE — 96375 TX/PRO/DX INJ NEW DRUG ADDON: CPT

## 2018-07-18 PROCEDURE — 96374 THER/PROPH/DIAG INJ IV PUSH: CPT

## 2018-07-18 PROCEDURE — 99284 EMERGENCY DEPT VISIT MOD MDM: CPT

## 2018-07-18 PROCEDURE — 25010000002 KETOROLAC TROMETHAMINE PER 15 MG: Performed by: EMERGENCY MEDICINE

## 2018-07-18 PROCEDURE — 80053 COMPREHEN METABOLIC PANEL: CPT | Performed by: EMERGENCY MEDICINE

## 2018-07-18 PROCEDURE — 25010000002 ONDANSETRON PER 1 MG: Performed by: EMERGENCY MEDICINE

## 2018-07-18 PROCEDURE — 25010000002 KETOROLAC TROMETHAMINE PER 15 MG: Performed by: NURSE PRACTITIONER

## 2018-07-18 PROCEDURE — 85025 COMPLETE CBC W/AUTO DIFF WBC: CPT | Performed by: EMERGENCY MEDICINE

## 2018-07-18 PROCEDURE — 81001 URINALYSIS AUTO W/SCOPE: CPT | Performed by: EMERGENCY MEDICINE

## 2018-07-18 RX ORDER — SODIUM CHLORIDE 0.9 % (FLUSH) 0.9 %
10 SYRINGE (ML) INJECTION AS NEEDED
Status: DISCONTINUED | OUTPATIENT
Start: 2018-07-18 | End: 2018-07-18 | Stop reason: HOSPADM

## 2018-07-18 RX ORDER — ONDANSETRON 2 MG/ML
4 INJECTION INTRAMUSCULAR; INTRAVENOUS ONCE
Status: COMPLETED | OUTPATIENT
Start: 2018-07-18 | End: 2018-07-18

## 2018-07-18 RX ORDER — KETOROLAC TROMETHAMINE 15 MG/ML
15 INJECTION, SOLUTION INTRAMUSCULAR; INTRAVENOUS ONCE
Status: COMPLETED | OUTPATIENT
Start: 2018-07-18 | End: 2018-07-18

## 2018-07-18 RX ADMIN — KETOROLAC TROMETHAMINE 15 MG: 15 INJECTION, SOLUTION INTRAMUSCULAR; INTRAVENOUS at 09:46

## 2018-07-18 RX ADMIN — ONDANSETRON 4 MG: 2 INJECTION INTRAMUSCULAR; INTRAVENOUS at 09:45

## 2018-07-18 RX ADMIN — LIDOCAINE HYDROCHLORIDE 125 MG: 10 INJECTION, SOLUTION EPIDURAL; INFILTRATION; INTRACAUDAL; PERINEURAL at 10:57

## 2018-07-18 RX ADMIN — KETOROLAC TROMETHAMINE 15 MG: 15 INJECTION, SOLUTION INTRAMUSCULAR; INTRAVENOUS at 13:40

## 2018-07-18 RX ADMIN — HYDROMORPHONE HYDROCHLORIDE 1 MG: 1 INJECTION, SOLUTION INTRAMUSCULAR; INTRAVENOUS; SUBCUTANEOUS at 09:45

## 2018-07-18 RX ADMIN — Medication 10 ML: at 13:40

## 2018-07-18 RX ADMIN — SODIUM CHLORIDE 1000 ML: 9 INJECTION, SOLUTION INTRAVENOUS at 09:45

## 2018-07-20 LAB — BACTERIA SPEC AEROBE CULT: NORMAL

## 2019-01-21 RX ORDER — ALBUTEROL SULFATE 90 UG/1
AEROSOL, METERED RESPIRATORY (INHALATION)
Qty: 18 G | Refills: 2 | Status: SHIPPED | OUTPATIENT
Start: 2019-01-21

## 2019-05-17 ENCOUNTER — TELEPHONE (OUTPATIENT)
Dept: PULMONOLOGY | Facility: CLINIC | Age: 39
End: 2019-05-17

## 2019-05-21 ENCOUNTER — TELEPHONE (OUTPATIENT)
Dept: INTERNAL MEDICINE | Facility: CLINIC | Age: 39
End: 2019-05-21

## 2019-05-23 DIAGNOSIS — G47.33 OBSTRUCTIVE SLEEP APNEA, ADULT: Primary | ICD-10-CM

## 2019-06-19 DIAGNOSIS — G47.33 OBSTRUCTIVE SLEEP APNEA, ADULT: Primary | ICD-10-CM

## 2019-07-01 ENCOUNTER — OFFICE VISIT (OUTPATIENT)
Dept: SLEEP MEDICINE | Facility: HOSPITAL | Age: 39
End: 2019-07-01

## 2019-07-01 VITALS
BODY MASS INDEX: 29.7 KG/M2 | HEART RATE: 50 BPM | HEIGHT: 68 IN | WEIGHT: 196 LBS | SYSTOLIC BLOOD PRESSURE: 132 MMHG | DIASTOLIC BLOOD PRESSURE: 74 MMHG | OXYGEN SATURATION: 96 %

## 2019-07-01 DIAGNOSIS — G47.33 OSA (OBSTRUCTIVE SLEEP APNEA): Primary | ICD-10-CM

## 2019-07-01 PROCEDURE — 99212 OFFICE O/P EST SF 10 MIN: CPT | Performed by: NURSE PRACTITIONER

## 2019-07-01 RX ORDER — NAPROXEN 500 MG/1
TABLET ORAL
COMMUNITY
Start: 2019-06-26

## 2019-07-01 NOTE — PATIENT INSTRUCTIONS

## 2019-07-01 NOTE — PROGRESS NOTES
"    Chief Complaint:   Chief Complaint   Patient presents with   • Follow-up       HPI:    Bennie Tapia is a 38 y.o. male here for follow-up of moderate sleep apnea.  Patient was last seen 1/31/2018.  Patient states he is doing very well with CPAP therapy.  Patient is sleeping 8 hours nightly and feels rested upon awakening.  Patient does not nap.  Patient does not snore.  Patient has an Morrice score of 4/24.  Patient states \"I will not sleep without it.\".  He has no concerns or complaints today and wishes to continue CPAP.        Current medications are:   Current Outpatient Medications:   •  albuterol sulfate HFA (PROAIR HFA) 108 (90 Base) MCG/ACT inhaler, 2 puffs q4-6hr prn asthma, wheeze, cough or shortness of breath, Disp: 18 g, Rfl: 2  •  Loratadine (CLARITIN) 10 MG capsule, Take  by mouth., Disp: , Rfl:   •  naproxen (NAPROSYN) 500 MG tablet, , Disp: , Rfl:   •  omeprazole (priLOSEC) 20 MG capsule, Take 20 mg by mouth Daily., Disp: , Rfl:   •  HYDROmorphone (DILAUDID) 2 MG tablet, Take 1 tablet by mouth Every 4 (Four) Hours As Needed for Moderate Pain ., Disp: 12 tablet, Rfl: 0  •  meloxicam (MOBIC) 15 MG tablet, 1 tab po qd prn pain or inflammation, Disp: 30 tablet, Rfl: 1  •  ondansetron ODT (ZOFRAN-ODT) 4 MG disintegrating tablet, Take 1 tablet by mouth Every 8 (Eight) Hours As Needed for Nausea or Vomiting., Disp: 6 tablet, Rfl: 0  •  tamsulosin (FLOMAX) 0.4 MG capsule 24 hr capsule, Take 1 capsule by mouth Every Night., Disp: 7 capsule, Rfl: 0.      The patient's relevant past medical, surgical, family and social history were reviewed and updated in Epic as appropriate.       Review of Systems   HENT: Positive for tinnitus.    Respiratory: Positive for apnea.    Gastrointestinal:        Heartburn   Allergic/Immunologic: Positive for environmental allergies.   Psychiatric/Behavioral: Positive for sleep disturbance.         Objective:    Physical Exam   Constitutional: He is oriented to person, " place, and time. He appears well-developed and well-nourished.   HENT:   Head: Normocephalic and atraumatic.   Mouth/Throat: Oropharynx is clear and moist.   Mallampati 3 anatomy   Eyes: Conjunctivae are normal.   Neck: Neck supple. No thyromegaly present.   Cardiovascular: Normal rate and regular rhythm.   Pulmonary/Chest: Effort normal and breath sounds normal.   Lymphadenopathy:     He has no cervical adenopathy.   Neurological: He is alert and oriented to person, place, and time.   Skin: Skin is warm and dry.   Psychiatric: He has a normal mood and affect. His behavior is normal. Judgment and thought content normal.   Nursing note and vitals reviewed.  175/1 80 days of use.  90% pressure 9.0.  AHI 0.9.  Greater than 4-hour use 95.0.  Download reviewed with patient.      ASSESSMENT/PLAN    Bennie was seen today for follow-up.    Diagnoses and all orders for this visit:    MICAELA (obstructive sleep apnea)  -     CPAP Therapy            1. Counseled patient regarding multimodal approach with healthy nutrition, healthy sleep, regular physical activity, social activities, counseling, and medications. Encouraged to practice sleep position. Avoid alcohol and sedatives close to bedtime.  2. Refill supplies x1 year.  Return to clinic 1 year sooner if symptoms warrant.    I have reviewed the results of my evaluation and impression and discussed my recommendations in detail with the patient.      Signed by  TROY Fish    July 1, 2019      CC: Munira Metz MD Cooper, Kirsten Lea, MD

## 2020-07-01 ENCOUNTER — TELEMEDICINE (OUTPATIENT)
Dept: SLEEP MEDICINE | Facility: HOSPITAL | Age: 40
End: 2020-07-01

## 2020-07-01 VITALS — HEIGHT: 68 IN | WEIGHT: 185 LBS | BODY MASS INDEX: 28.04 KG/M2

## 2020-07-01 DIAGNOSIS — G47.33 OSA (OBSTRUCTIVE SLEEP APNEA): Primary | ICD-10-CM

## 2020-07-01 PROCEDURE — 99212 OFFICE O/P EST SF 10 MIN: CPT | Performed by: NURSE PRACTITIONER

## 2020-07-01 NOTE — PROGRESS NOTES
Chief Complaint:   Chief Complaint   Patient presents with   • Follow-up       HPI:    Bennie Tapia is a 39 y.o. male here for follow-up of sleep apnea.  Patient was last seen 7/1/2019.  Patient states he is doing well with CPAP therapy.  Patient is sleeping 7 hours nightly and does go to sleep within 10 minutes.  Patient does not get up throughout the night.  Patient has an [place] score of 3/24.  Patient has no skin irritation from the mask, no dry mucosa complaints, no air leak complaints, no difficulty putting mask on or off, and no difficulty breathing while wearing CPAP.  Patient does wish to continue with CPAP therapy.    Past Medical History:   Diagnosis Date   • GERD (gastroesophageal reflux disease)    • Kidney stone          Current medications are:   Current Outpatient Medications:   •  albuterol sulfate HFA (PROAIR HFA) 108 (90 Base) MCG/ACT inhaler, 2 puffs q4-6hr prn asthma, wheeze, cough or shortness of breath, Disp: 18 g, Rfl: 2  •  Loratadine (CLARITIN) 10 MG capsule, Take  by mouth., Disp: , Rfl:   •  naproxen (NAPROSYN) 500 MG tablet, , Disp: , Rfl:   •  omeprazole (priLOSEC) 20 MG capsule, Take 20 mg by mouth Daily., Disp: , Rfl:   •  tamsulosin (FLOMAX) 0.4 MG capsule 24 hr capsule, Take 1 capsule by mouth Every Night., Disp: 7 capsule, Rfl: 0.      The patient's relevant past medical, surgical, family and social history were reviewed and updated in Epic as appropriate.       Review of Systems   HENT: Positive for tinnitus.    Gastrointestinal:        Heartburn   Allergic/Immunologic: Positive for environmental allergies.   Psychiatric/Behavioral: Positive for sleep disturbance.   All other systems reviewed and are negative.        Objective:    Physical Exam   Constitutional: He is oriented to person, place, and time. He appears well-developed and well-nourished.   HENT:   Head: Normocephalic and atraumatic.   Mallampati 3 anatomy   Neck: No tracheal deviation present.    Pulmonary/Chest: Effort normal.   Musculoskeletal: Normal range of motion.   Neurological: He is alert and oriented to person, place, and time.   Psychiatric: He has a normal mood and affect. His behavior is normal. Judgment and thought content normal.     45/46 days of use.  Greater than 4-hour use 97.8%.  90% pressure 8.9.  AHI 0.8.  Settings 8 to 18 cm H2O.  Download reviewed with patient.    ASSESSMENT/PLAN    Bennie was seen today for follow-up.    Diagnoses and all orders for this visit:    MICAELA (obstructive sleep apnea)  -     CPAP Therapy            1. Counseled patient regarding multimodal approach with healthy nutrition, healthy sleep, regular physical activity, social activities, counseling, and medications. Encouraged to practice lateral sleep position. Avoid alcohol and sedatives close to bedtime.  2.   Refill supplies x1 year.  Return to clinic 1 year or sooner symptoms warrant.  Verbal consent was given and we did move forward with video visit.  I have reviewed the results of my evaluation and impression and discussed my recommendations in detail with the patient.      Signed by  TROY Fish    July 1, 2020      CC: Munira Metz MD          No ref. provider found

## 2021-07-01 ENCOUNTER — OFFICE VISIT (OUTPATIENT)
Dept: SLEEP MEDICINE | Facility: HOSPITAL | Age: 41
End: 2021-07-01

## 2021-07-01 VITALS
SYSTOLIC BLOOD PRESSURE: 131 MMHG | DIASTOLIC BLOOD PRESSURE: 86 MMHG | HEART RATE: 59 BPM | HEIGHT: 68 IN | OXYGEN SATURATION: 98 % | BODY MASS INDEX: 30.43 KG/M2 | WEIGHT: 200.8 LBS

## 2021-07-01 DIAGNOSIS — G47.33 OSA (OBSTRUCTIVE SLEEP APNEA): Primary | ICD-10-CM

## 2021-07-01 PROCEDURE — 99213 OFFICE O/P EST LOW 20 MIN: CPT | Performed by: NURSE PRACTITIONER

## 2021-07-01 NOTE — PROGRESS NOTES
"    Chief Complaint:   Chief Complaint   Patient presents with   • Follow-up       HPI:    Bennie Tapia is a 40 y.o. male here for follow-up of sleep apnea.  Patient was last seen 7/1/2020.  Patient states he continues to do well with CPAP therapy.  He is sleeping 8 hours nightly and states cannot sleep without it.\"  He will go to sleep within 10 minutes and does not get up during the night.  Patient has an Scalf score of 1/24.  Patient has no concerns or complaints regarding CPAP therapy and wishes to continue.        Current medications are:   Current Outpatient Medications:   •  albuterol sulfate HFA (PROAIR HFA) 108 (90 Base) MCG/ACT inhaler, 2 puffs q4-6hr prn asthma, wheeze, cough or shortness of breath, Disp: 18 g, Rfl: 2  •  Diclofenac Sodium (VOLTAREN) 1 % gel gel, , Disp: , Rfl:   •  Loratadine (CLARITIN) 10 MG capsule, Take  by mouth., Disp: , Rfl:   •  naproxen (NAPROSYN) 500 MG tablet, , Disp: , Rfl:   •  omeprazole (priLOSEC) 20 MG capsule, Take 20 mg by mouth Daily., Disp: , Rfl:   •  tamsulosin (FLOMAX) 0.4 MG capsule 24 hr capsule, Take 1 capsule by mouth Every Night., Disp: 7 capsule, Rfl: 0  •  cyclobenzaprine (FLEXERIL) 5 MG tablet, Take 1 tablet by mouth 3 (Three) Times a Day As Needed for Muscle Spasms., Disp: 30 tablet, Rfl: 0.      The patient's relevant past medical, surgical, family and social history were reviewed and updated in Epic as appropriate.       Review of Systems   HENT: Positive for tinnitus.    Respiratory: Positive for apnea.    Gastrointestinal:        Heartburn   Allergic/Immunologic: Positive for environmental allergies.   Psychiatric/Behavioral: Positive for sleep disturbance.   All other systems reviewed and are negative.        Objective:    Physical Exam  Constitutional:       Appearance: Normal appearance.   HENT:      Head: Normocephalic and atraumatic.      Mouth/Throat:      Mouth: Mucous membranes are moist.      Pharynx: Oropharynx is clear.      Comments: " Mallampati 3 anatomy  Eyes:      Conjunctiva/sclera: Conjunctivae normal.   Cardiovascular:      Rate and Rhythm: Normal rate and regular rhythm.   Pulmonary:      Effort: Pulmonary effort is normal.      Breath sounds: Normal breath sounds.   Skin:     General: Skin is warm and dry.      Coloration: Skin is not pale.   Neurological:      Mental Status: He is alert and oriented to person, place, and time.   Psychiatric:         Mood and Affect: Mood normal.         Behavior: Behavior normal.         Thought Content: Thought content normal.         Judgment: Judgment normal.       89/90 days of use  Greater than 4-hour use 98.9  90% pressure 9.5  AHI 1.0  Settings 8-18    ASSESSMENT/PLAN    Diagnoses and all orders for this visit:    1. MICAELA (obstructive sleep apnea) (Primary)  -     CPAP Therapy            1. Counseled patient regarding multimodal approach with healthy nutrition, healthy sleep, regular physical activity, social activities, counseling, and medications. Encouraged to practice lateral sleep position. Avoid alcohol and sedatives close to bedtime.  2.   Refill supplies x1 year.  Return to clinic 1 year or sooner symptoms warrant.  I have reviewed the results of my evaluation and impression and discussed my recommendations in detail with the patient.      Signed by  TROY Fish    July 1, 2021      CC: Munira Metz MD          No ref. provider found

## 2022-07-01 ENCOUNTER — TELEMEDICINE (OUTPATIENT)
Dept: SLEEP MEDICINE | Facility: HOSPITAL | Age: 42
End: 2022-07-01

## 2022-07-01 VITALS — HEIGHT: 68 IN | BODY MASS INDEX: 31.52 KG/M2 | WEIGHT: 208 LBS

## 2022-07-01 DIAGNOSIS — G47.33 OSA (OBSTRUCTIVE SLEEP APNEA): Primary | ICD-10-CM

## 2022-07-01 PROCEDURE — 99213 OFFICE O/P EST LOW 20 MIN: CPT | Performed by: NURSE PRACTITIONER

## 2022-07-01 NOTE — PROGRESS NOTES
Chief Complaint:   Chief Complaint   Patient presents with   • Follow-up       HPI:    Bennie Tapia is a 41 y.o. male here for follow-up of sleep apnea.  Patient has a history of tinnitus, GERD, asthma and sleep apnea.  Patient was last seen 7/1/2021.  Patient continues to do well with CPAP therapy.  Patient is sleeping 8 hours nightly and does feel rested upon awakening.  Patient will go to sleep within 10 minutes and does get up x1.  Patient has an [place] score of 1/24.  Patient does feel his CPAP is beneficial.  Patient has no concerns or complaints and will continue therapy.        Current medications are:   Current Outpatient Medications:   •  albuterol sulfate HFA (PROAIR HFA) 108 (90 Base) MCG/ACT inhaler, 2 puffs q4-6hr prn asthma, wheeze, cough or shortness of breath, Disp: 18 g, Rfl: 2  •  cyclobenzaprine (FLEXERIL) 10 MG tablet, Take 1 tablet by mouth 2 (Two) Times a Day As Needed for Muscle Spasms., Disp: 12 tablet, Rfl: 0  •  Diclofenac Sodium (VOLTAREN) 1 % gel gel, , Disp: , Rfl:   •  Loratadine (CLARITIN) 10 MG capsule, Take  by mouth., Disp: , Rfl:   •  naproxen (NAPROSYN) 375 MG tablet, , Disp: , Rfl:   •  naproxen (NAPROSYN) 500 MG tablet, , Disp: , Rfl:   •  omeprazole (priLOSEC) 20 MG capsule, Take 20 mg by mouth Daily., Disp: , Rfl:   •  predniSONE (DELTASONE) 10 MG (21) dose pack, Take  by mouth Daily. Use as directed on package, Disp: 21 tablet, Rfl: 0  •  tamsulosin (FLOMAX) 0.4 MG capsule 24 hr capsule, Take 1 capsule by mouth Every Night., Disp: 7 capsule, Rfl: 0.      The patient's relevant past medical, surgical, family and social history were reviewed and updated in Epic as appropriate.       Review of Systems   HENT: Positive for tinnitus.    Respiratory: Positive for apnea, shortness of breath and wheezing.    Gastrointestinal:        Heartburn   Musculoskeletal: Positive for arthralgias.   Allergic/Immunologic: Positive for environmental allergies.   All other systems reviewed  and are negative.        Objective:    Physical Exam  Constitutional:       Appearance: Normal appearance.   HENT:      Head: Normocephalic and atraumatic.      Mouth/Throat:      Comments: Mallampati 3 anatomy  Pulmonary:      Effort: Pulmonary effort is normal. No respiratory distress.   Neurological:      Mental Status: He is alert and oriented to person, place, and time.   Psychiatric:         Mood and Affect: Mood normal.         Behavior: Behavior normal.         Thought Content: Thought content normal.         Judgment: Judgment normal.         CPAP Report    87/90 days of use  Greater than 4-hour use 93.3  90% pressure 9.0  AHI of 2.0  Settings 8-18  The patient continues to use and benefit from CPAP therapy.    ASSESSMENT/PLAN    Diagnoses and all orders for this visit:    1. MICAELA (obstructive sleep apnea) (Primary)  -     PAP Therapy        1. Counseled patient regarding multimodal approach with healthy nutrition, healthy sleep, regular physical activity, social activities, counseling, and medications. Encouraged to practice lateral sleep position. Avoid alcohol and sedatives close to bedtime.  2. Refill supplies x1 year.  Return to clinic 1 year sooner symptoms warrant.  Patient gave consent for video visit.      I have reviewed the results of my evaluation and impression and discussed my recommendations in detail with the patient.      Signed by  TROY Fish    July 1, 2022      CC: Munira Metz MD         No ref. provider found

## 2024-06-28 ENCOUNTER — TELEMEDICINE (OUTPATIENT)
Dept: SLEEP MEDICINE | Facility: CLINIC | Age: 44
End: 2024-06-28
Payer: OTHER GOVERNMENT

## 2024-06-28 VITALS — BODY MASS INDEX: 31.83 KG/M2 | HEIGHT: 68 IN | WEIGHT: 210 LBS

## 2024-06-28 DIAGNOSIS — G47.33 OSA (OBSTRUCTIVE SLEEP APNEA): Primary | ICD-10-CM

## 2024-06-28 PROCEDURE — 99213 OFFICE O/P EST LOW 20 MIN: CPT | Performed by: NURSE PRACTITIONER

## 2024-06-28 NOTE — PROGRESS NOTES
Chief Complaint:   Chief Complaint   Patient presents with    Follow-up       HPI:    Bennie Tapia is a 43 y.o. male here for follow-up of sleep apnea.  Patient was last seen 6/30/2023.  Patient continues to do well with CPAP therapy.  Patient is sleeping 8 hours nightly and feels rested upon awakening.  Patient goes to sleep easily and does get up x 1.  Patient does well with nasal mask and heated tubing.  Patient puts his Holbrook score of 0/24.  Patient has no concerns or complaints and will continue therapy.        Current medications are:   Current Outpatient Medications:     albuterol sulfate HFA (PROAIR HFA) 108 (90 Base) MCG/ACT inhaler, 2 puffs q4-6hr prn asthma, wheeze, cough or shortness of breath, Disp: 18 g, Rfl: 2    Diclofenac Sodium (VOLTAREN) 1 % gel gel, , Disp: , Rfl:     Loratadine (CLARITIN) 10 MG capsule, Take  by mouth., Disp: , Rfl:     naproxen (NAPROSYN) 375 MG tablet, , Disp: , Rfl:     naproxen (NAPROSYN) 500 MG tablet, , Disp: , Rfl:     omeprazole (priLOSEC) 20 MG capsule, Take 20 mg by mouth Daily., Disp: , Rfl: .      The patient's relevant past medical, surgical, family and social history were reviewed and updated in Epic as appropriate.       Review of Systems   HENT:  Positive for tinnitus.    Eyes:  Positive for visual disturbance.   Respiratory:  Positive for apnea and wheezing.    Cardiovascular:  Negative for leg swelling.   Gastrointestinal:         Heartburn   Musculoskeletal:  Positive for arthralgias.   Allergic/Immunologic: Positive for environmental allergies.   Neurological:  Positive for numbness and headaches.   Psychiatric/Behavioral:  Positive for sleep disturbance.    All other systems reviewed and are negative.        Objective:    Physical Exam  Constitutional:       Appearance: Normal appearance.   HENT:      Head: Normocephalic and atraumatic.      Mouth/Throat:      Comments: Mallampati 3 anatomy  Pulmonary:      Effort: Pulmonary effort is normal. No  respiratory distress.   Neurological:      Mental Status: He is alert and oriented to person, place, and time.   Psychiatric:         Mood and Affect: Mood normal.         Behavior: Behavior normal.         Thought Content: Thought content normal.         Judgment: Judgment normal.         CPAP Report  89/90 days of use  Greater than 4-hour use 96.7  90% pressure 9.0  AHI 1.5  Sitting 8-18    The patient continues to use and benefit from CPAP therapy.    ASSESSMENT/PLAN    Diagnoses and all orders for this visit:    1. MICAELA (obstructive sleep apnea) (Primary)  -     PAP Therapy        Counseled patient regarding multimodal approach with healthy nutrition, healthy sleep, regular physical activity, social activities, counseling, and medications. Encouraged to practice lateral sleep position. Avoid alcohol and sedatives close to bedtime.  Refill supplies x 1 year.  Return to clinic 1 year or sooner as symptoms warrant.    The patient is located in [facility] at home. The patient presents today for telehealth service.  This service was conducted via audio/video technology through a secure [employer] video visit connection through Epic.  This provider is located in [facility].  Patient stated they are in a secure environment for the session.  Patient's condition being diagnosed/treated is appropriate for telemedicine.  The provider identified himself as well as his credentials.  The patient, and/or patient's guardian, consent to be seen remotely, and when consent is given they understanding that the consent allows for patient identifiable information to be sent to a third-party as needed.  They may refuse to be seen remotely at any time.  The electronic data is encrypted and password protected, and the patient and/or guardian has been advised of the potential risk to privacy not withstanding such measures.  Patient identifiers used: Name and date of birth.   I have reviewed the results of my evaluation and  impression and discussed my recommendations in detail with the patient.      Signed by  Vale Meier, APRN    June 28, 2024      CC: Munira Metz MD         No ref. provider found

## 2025-01-08 ENCOUNTER — TELEPHONE (OUTPATIENT)
Dept: SLEEP MEDICINE | Facility: CLINIC | Age: 45
End: 2025-01-08

## 2025-01-08 NOTE — TELEPHONE ENCOUNTER
Caller: Bennie Tapia    Relationship to patient: Self    Best call back number: [phone]    Patient is needing: [facility] MOST RECENT SLEEP REPORT FAXED TO THEM AT # [phone].